# Patient Record
Sex: MALE | Race: WHITE | NOT HISPANIC OR LATINO | Employment: OTHER | URBAN - METROPOLITAN AREA
[De-identification: names, ages, dates, MRNs, and addresses within clinical notes are randomized per-mention and may not be internally consistent; named-entity substitution may affect disease eponyms.]

---

## 2017-01-04 RX ORDER — MONTELUKAST SODIUM 10 MG/1
TABLET ORAL
Qty: 90 TABLET | Refills: 1 | Status: SHIPPED | OUTPATIENT
Start: 2017-01-04 | End: 2017-06-30 | Stop reason: SDUPTHER

## 2017-01-04 RX ORDER — LOSARTAN POTASSIUM 25 MG/1
TABLET ORAL
Qty: 90 TABLET | Refills: 2 | Status: SHIPPED | OUTPATIENT
Start: 2017-01-04 | End: 2017-09-01 | Stop reason: SDUPTHER

## 2017-01-04 RX ORDER — OMEPRAZOLE 40 MG/1
CAPSULE, DELAYED RELEASE ORAL
Qty: 90 CAPSULE | Refills: 1 | Status: SHIPPED | OUTPATIENT
Start: 2017-01-04 | End: 2017-06-01 | Stop reason: SDUPTHER

## 2017-02-10 RX ORDER — MELOXICAM 7.5 MG/1
TABLET ORAL
Qty: 90 TABLET | Refills: 1 | Status: SHIPPED | OUTPATIENT
Start: 2017-02-10 | End: 2017-08-09 | Stop reason: SDUPTHER

## 2017-02-23 ENCOUNTER — OFFICE VISIT (OUTPATIENT)
Dept: FAMILY MEDICINE | Facility: CLINIC | Age: 67
End: 2017-02-23
Payer: MEDICARE

## 2017-02-23 VITALS
BODY MASS INDEX: 35.42 KG/M2 | WEIGHT: 276 LBS | DIASTOLIC BLOOD PRESSURE: 84 MMHG | HEIGHT: 74 IN | HEART RATE: 67 BPM | SYSTOLIC BLOOD PRESSURE: 136 MMHG

## 2017-02-23 DIAGNOSIS — J01.01 ACUTE RECURRENT MAXILLARY SINUSITIS: Primary | ICD-10-CM

## 2017-02-23 PROCEDURE — 99213 OFFICE O/P EST LOW 20 MIN: CPT | Mod: PBBFAC,PO | Performed by: NURSE PRACTITIONER

## 2017-02-23 PROCEDURE — 99213 OFFICE O/P EST LOW 20 MIN: CPT | Mod: S$PBB,,, | Performed by: NURSE PRACTITIONER

## 2017-02-23 PROCEDURE — 99999 PR PBB SHADOW E&M-EST. PATIENT-LVL III: CPT | Mod: PBBFAC,,, | Performed by: NURSE PRACTITIONER

## 2017-02-23 RX ORDER — AMOXICILLIN AND CLAVULANATE POTASSIUM 875; 125 MG/1; MG/1
1 TABLET, FILM COATED ORAL 2 TIMES DAILY
Qty: 20 TABLET | Refills: 0 | Status: SHIPPED | OUTPATIENT
Start: 2017-02-23 | End: 2017-03-05

## 2017-02-23 RX ORDER — PROMETHAZINE HYDROCHLORIDE AND DEXTROMETHORPHAN HYDROBROMIDE 6.25; 15 MG/5ML; MG/5ML
5 SYRUP ORAL 3 TIMES DAILY PRN
Qty: 240 ML | Refills: 0 | Status: SHIPPED | OUTPATIENT
Start: 2017-02-23 | End: 2017-03-05

## 2017-02-23 RX ORDER — FLUTICASONE PROPIONATE 50 MCG
1 SPRAY, SUSPENSION (ML) NASAL DAILY
Qty: 16 G | Refills: 0 | Status: SHIPPED | OUTPATIENT
Start: 2017-02-23 | End: 2018-08-15 | Stop reason: SDUPTHER

## 2017-02-23 NOTE — MR AVS SNAPSHOT
[facility]  [address]  [facility] [address]  Phone: [phone]  Fax: [phone]                  Matthew Rice   2017 9:20 AM   Office Visit    Description:  Male : 1950   Provider:  Jazmine Herman NP   Department:  [facility]           Reason for Visit     Sinus Problem     Cough           Diagnoses this Visit        Comments    Acute recurrent maxillary sinusitis    -  Primary            To Do List           Future Appointments        Provider Department Dept Phone    2017 9:20 AM Jazmine Herman NP [facility] [phone]    3/30/2017 8:00 AM Alvaro Bone MD [facility] [phone]      Goals (5 Years of Data)     None       These Medications        Disp Refills Start End    fluticasone (FLONASE) 50 mcg/actuation nasal spray 16 g 0 2017     1 spray by Each Nare route once daily. - Each Nare    Pharmacy: [facility] [address] [place] [address] AVE AT [facility] Ph #: [phone]       amoxicillin-clavulanate 875-125mg (AUGMENTIN) 875-125 mg per tablet 20 tablet 0 2017 3/5/2017    Take 1 tablet by mouth 2 (two) times daily. - Oral    Pharmacy: [facility] [address] [place], [place] [address]E AT [facility] Ph #: [phone]       promethazine-dextromethorphan (PROMETHAZINE-DM) 6.25-15 mg/5 mL Syrp 240 mL 0 2017 3/5/2017    Take 5 mLs by mouth 3 (three) times daily as needed. - Oral    Pharmacy: [facility] [address] [place] [address] AVE AT [facility] Ph #: [phone]         [facility]     [facility] On Call Nurse Care Line -  Assistance  Registered nurses in the [facility] provide clinical advisement, health education, appointment booking, and other advisory services.  Call for this free service at [phone].              Medications           START taking these NEW medications        Refills    fluticasone (FLONASE) 50 mcg/actuation nasal spray 0    Si spray by Each Nare route once daily.    Class: Normal    Route: Each Nare    amoxicillin-clavulanate 875-125mg (AUGMENTIN) 875-125 mg per tablet 0    Sig: Take 1 tablet by mouth 2 (two) times daily.    Class: Normal    Route: Oral    promethazine-dextromethorphan (PROMETHAZINE-DM) 6.25-15 mg/5 mL Syrp 0    Sig: Take 5 mLs by mouth 3 (three) times daily as needed.    Class: Normal    Route: Oral      STOP taking these medications     olopatadine (PATANOL) 0.1 % ophthalmic solution Place 1 drop into both eyes 2 (two) times daily.           Verify that the below list of medications is an accurate representation of the medications you are currently taking.  If none reported, the list may be blank. If incorrect, please contact your healthcare provider. Carry this list with you in case of emergency.           Current Medications     acepromazine maleate, bulk, 100 % Powd by Misc.(Non-Drug; Combo Route) route.      buPROPion (WELLBUTRIN XL) 150 MG TB24 tablet Take 300 mg by mouth once daily.     DIFLUCAN 200 mg Tab TAKE 1 TABLET EVERY 7 DAYS    ibuprofen (ADVIL,MOTRIN) 100 MG tablet Take 100 mg by mouth every 6 (six) hours as needed for Temperature greater than.    ibuprofen (ADVIL,MOTRIN) 800 MG tablet Take 800 mg by mouth. One at onset of headache.    losartan (COZAAR) 25 MG tablet TAKE 1 TABLET DAILY    meloxicam (MOBIC) 7.5 MG tablet TAKE 1 TABLET DAILY    montelukast (SINGULAIR) 10 mg tablet TAKE 1 TABLET EVERY EVENING    omeprazole (PRILOSEC) 40 MG capsule TAKE 1 CAPSULE BEFORE BREAKFAST    sumatriptan (IMITREX) 50 MG tablet Take 50 mg by mouth every 2 (two) hours as needed for Migraine.    tramadol-acetaminophen 37.5-325 mg (ULTRACET) 37.5-325 mg Tab Take 1 tablet by mouth every 4 (four) hours as needed for Pain.    VIAGRA 100 mg tablet TAKE 1 TABLET DAILY AS NEEDED FOR ERECTILE  "DYSFUNCTION    amoxicillin-clavulanate 875-125mg (AUGMENTIN) 875-125 mg per tablet Take 1 tablet by mouth 2 (two) times daily.    fluticasone (FLONASE) 50 mcg/actuation nasal spray 1 spray by Each Nare route once daily.    promethazine-dextromethorphan (PROMETHAZINE-DM) 6.25-15 mg/5 mL Syrp Take 5 mLs by mouth 3 (three) times daily as needed.           Clinical Reference Information           Your Vitals Were     BP Pulse Height Weight BMI    136/84 67 6' 2" (1.88 m) 125.2 kg (276 lb 0.3 oz) 35.44 kg/m2      Blood Pressure          Most Recent Value    BP  136/84      Allergies as of 2/23/2017     No Known Allergies      Immunizations Administered on Date of Encounter - 2/23/2017     None      Language Assistance Services     ATTENTION: Language assistance services are available, free of charge. Please call [phone].      ATENCIÓN: Si habla lance, tiene a hernandez disposición servicios gratuitos de asistencia lingüística. Llame al [phone].     COURTNEY Ý: N?u b?n nói Ti?ng Vi?t, có các d?ch v? h? tr? ngôn ng? mi?n phí lora cho b?n. G?i s? [phone].         [facility] complies with applicable Federal civil rights laws and does not discriminate on the basis of race, color, national origin, age, disability, or sex.        "

## 2017-02-23 NOTE — PROGRESS NOTES
"Subjective:       Patient ID: Matthew Rice is a 66 y.o. male.    Chief Complaint: Sinus Problem and Cough    HPI Comments: TETANUS VACCINE due on 05/14/1969  Pneumococcal (65+)(1 of 2 - PCV13) due on 05/14/2016    Past Medical History:  Past Medical History:    Arthritis of knee                                             Arthritis, shoulder region                                    Calculus of kidney                                            Calculus of kidney                                            Complication of anesthesia                                      Comment:pt states he has a "hard time waking up"    Depression                                                    Essential hypertension, benign                                Generalized osteoarthrosis, unspecified site                  Hypertension                                                  Idiopathic progressive polyneuropathy                         Impotence of organic origin                                   Obesity, unspecified                                          Osteoarthrosis, unspecified whether generalize*               Pneumonia                                                       Comment:1-4-13    Posttraumatic stress disorder                                 Pure hypercholesterolemia                                     Seborrheic dermatitis, unspecified                            Sleep apnea                                                   Sprain of medial collateral ligament of knee                  Unspecified conductive hearing loss                         Past Surgical History:    thumb cyst excision                                            ABDOMINAL HERNIA REPAIR                                      Review of patient's allergies indicates:  No Known Allergies  Current Outpatient Prescriptions on File Prior to Visit:  acepromazine maleate, bulk, 100 % Powd, by Misc.(Non-Drug; Combo Route) route.  , Disp: , Rfl: "   buPROPion (WELLBUTRIN XL) 150 MG TB24 tablet, Take 300 mg by mouth once daily. , Disp: , Rfl:   DIFLUCAN 200 mg Tab, TAKE 1 TABLET EVERY 7 DAYS, Disp: 12 tablet, Rfl: 0  ibuprofen (ADVIL,MOTRIN) 100 MG tablet, Take 100 mg by mouth every 6 (six) hours as needed for Temperature greater than., Disp: , Rfl:   ibuprofen (ADVIL,MOTRIN) 800 MG tablet, Take 800 mg by mouth. One at onset of headache., Disp: , Rfl:   losartan (COZAAR) 25 MG tablet, TAKE 1 TABLET DAILY, Disp: 90 tablet, Rfl: 2   meloxicam (MOBIC) 7.5 MG tablet, TAKE 1 TABLET DAILY, Disp: 90 tablet, Rfl: 1  montelukast (SINGULAIR) 10 mg tablet, TAKE 1 TABLET EVERY EVENING, Disp: 90 tablet, Rfl: 1  omeprazole (PRILOSEC) 40 MG capsule, TAKE 1 CAPSULE BEFORE BREAKFAST, Disp: 90 capsule, Rfl: 1  sumatriptan (IMITREX) 50 MG tablet, Take 50 mg by mouth every 2 (two) hours as needed for Migraine., Disp: , Rfl:   tramadol-acetaminophen 37.5-325 mg (ULTRACET) 37.5-325 mg Tab, Take 1 tablet by mouth every 4 (four) hours as needed for Pain., Disp: 60 tablet, Rfl: 3  VIAGRA 100 mg tablet, TAKE 1 TABLET DAILY AS NEEDED FOR ERECTILE DYSFUNCTION, Disp: 15 tablet, Rfl: 1  (DISCONTINUED) olopatadine (PATANOL) 0.1 % ophthalmic solution, Place 1 drop into both eyes 2 (two) times daily., Disp: 15 mL, Rfl: 1    No current facility-administered medications on file prior to visit.     Social History    Marital status: Single              Spouse name:                       Years of education:                 Number of children:               Occupational History    None on file    Social History Main Topics    Smoking status: Never Smoker                                                                   Smokeless status: Not on file                       Alcohol use: Yes                Comment: 4-5 beers 2-3 times per week    Drug use: Not on file     Sexual activity: Not on file          Other Topics            Concern    None on file    Social History Narrative    None on  file      Review of patient's family history indicates:    Anesthesia problems            Neg Hx                    Clotting disorder              Neg Hx                          Sinus Problem   This is a new problem. Episode onset: x 4 days. The problem has been gradually worsening since onset. There has been no fever. Associated symptoms include congestion, coughing (productive discolored sputum), a hoarse voice, sinus pressure (green and brown mucus) and a sore throat. Pertinent negatives include no chills, diaphoresis, ear pain, headaches, neck pain, shortness of breath, sneezing or swollen glands. Past treatments include nothing. The treatment provided no relief.     Review of Systems   Constitutional: Negative for chills, diaphoresis, fatigue and fever.   HENT: Positive for congestion, hoarse voice, postnasal drip, rhinorrhea, sinus pressure (green and brown mucus) and sore throat. Negative for ear pain and sneezing.    Respiratory: Positive for cough (productive discolored sputum). Negative for chest tightness, shortness of breath and wheezing.    Cardiovascular: Negative.    Gastrointestinal: Negative.  Negative for diarrhea, nausea and vomiting.   Musculoskeletal: Negative for neck pain.   Neurological: Negative for dizziness and headaches.       Objective:      Physical Exam   Constitutional: He is oriented to person, place, and time. No distress.   HENT:   Head: Normocephalic and atraumatic. Head is without raccoon's eyes.   Right Ear: A middle ear effusion is present.   Left Ear: A middle ear effusion is present.   Nose: Mucosal edema and rhinorrhea present. Right sinus exhibits maxillary sinus tenderness and frontal sinus tenderness. Left sinus exhibits maxillary sinus tenderness and frontal sinus tenderness.   Mouth/Throat: Uvula is midline. Posterior oropharyngeal erythema present.   Eyes: Pupils are equal, round, and reactive to light.   Neck: Normal range of motion.   Cardiovascular: Normal rate and  regular rhythm.  Exam reveals no friction rub.    No murmur heard.  Pulmonary/Chest: Effort normal and breath sounds normal. No respiratory distress. He has no wheezes.   Abdominal: Soft. Bowel sounds are normal. He exhibits no distension. There is no tenderness.   Musculoskeletal: He exhibits no edema.   Lymphadenopathy:     He has no cervical adenopathy.   Neurological: He is alert and oriented to person, place, and time.   Skin: He is not diaphoretic. No erythema.   Psychiatric: He has a normal mood and affect. His behavior is normal.   Vitals reviewed.      Assessment:       1. Acute recurrent maxillary sinusitis        Plan:       1. Acute recurrent maxillary sinusitis  Follow up if not resolving or for any new or worsening symptoms.   - fluticasone (FLONASE) 50 mcg/actuation nasal spray; 1 spray by Each Nare route once daily.  Dispense: 16 g; Refill: 0  - amoxicillin-clavulanate 875-125mg (AUGMENTIN) 875-125 mg per tablet; Take 1 tablet by mouth 2 (two) times daily.  Dispense: 20 tablet; Refill: 0  - promethazine-dextromethorphan (PROMETHAZINE-DM) 6.25-15 mg/5 mL Syrp; Take 5 mLs by mouth 3 (three) times daily as needed.  Dispense: 240 mL; Refill: 0

## 2017-03-16 ENCOUNTER — PATIENT OUTREACH (OUTPATIENT)
Dept: ADMINISTRATIVE | Facility: HOSPITAL | Age: 67
End: 2017-03-16

## 2017-03-16 NOTE — LETTER
March 22, 2017    Matthew Krystal  [address]  [place] [address]             [facility]  [address]  [facility] [address]  Phone: [phone] Dear Mr. Rice:    We have tried to reach you by mychart unsuccessfully.    [facility] is committed to your overall health.  To help you get the most out of each of your visits, we will review your information to make sure you are up to date on all of your recommended tests and/or procedures.       Dr. Alvaro Bone has found that you may be due for pneumonia and tetanus immunizations.     If you have had any of the above done at another facility, please bring the records or information with you so that your record at [facility] will be complete.     If you are currently taking medication, please bring it with you to your appointment for review.     Also, if you have any type of Advanced Directives, please bring them with you to your office visit so we may scan them into your chart.     If you have any questions or concerns, please don't hesitate to call.    Thank you for letting us care for you,  Aida Wallace LPN Clinical Care Coordinator  [facility]  [phone]

## 2017-03-30 ENCOUNTER — OFFICE VISIT (OUTPATIENT)
Dept: FAMILY MEDICINE | Facility: CLINIC | Age: 67
End: 2017-03-30
Payer: MEDICARE

## 2017-03-30 VITALS
DIASTOLIC BLOOD PRESSURE: 76 MMHG | WEIGHT: 274.69 LBS | SYSTOLIC BLOOD PRESSURE: 130 MMHG | HEART RATE: 63 BPM | RESPIRATION RATE: 18 BRPM | HEIGHT: 74 IN | BODY MASS INDEX: 35.25 KG/M2

## 2017-03-30 DIAGNOSIS — M17.0 OSTEOARTHRITIS OF BOTH KNEES, UNSPECIFIED OSTEOARTHRITIS TYPE: ICD-10-CM

## 2017-03-30 DIAGNOSIS — G43.909 MIGRAINE WITHOUT STATUS MIGRAINOSUS, NOT INTRACTABLE, UNSPECIFIED MIGRAINE TYPE: ICD-10-CM

## 2017-03-30 DIAGNOSIS — F43.10 PTSD (POST-TRAUMATIC STRESS DISORDER): ICD-10-CM

## 2017-03-30 DIAGNOSIS — I10 ESSENTIAL HYPERTENSION: Primary | ICD-10-CM

## 2017-03-30 PROCEDURE — 99999 PR PBB SHADOW E&M-EST. PATIENT-LVL III: CPT | Mod: PBBFAC,,, | Performed by: FAMILY MEDICINE

## 2017-03-30 PROCEDURE — 99214 OFFICE O/P EST MOD 30 MIN: CPT | Mod: S$PBB,,, | Performed by: FAMILY MEDICINE

## 2017-03-30 PROCEDURE — 99213 OFFICE O/P EST LOW 20 MIN: CPT | Mod: PBBFAC,PO | Performed by: FAMILY MEDICINE

## 2017-03-30 RX ORDER — TRAMADOL HYDROCHLORIDE AND ACETAMINOPHEN 37.5; 325 MG/1; MG/1
1 TABLET, FILM COATED ORAL EVERY 4 HOURS PRN
Qty: 60 TABLET | Refills: 3 | Status: SHIPPED | OUTPATIENT
Start: 2017-03-30 | End: 2017-06-30 | Stop reason: SDUPTHER

## 2017-03-30 NOTE — MR AVS SNAPSHOT
[facility]  [address] [facility] [address]  [facility] [address]  Phone: [phone]  Fax: [phone]                  Matthew Rice   3/30/2017 8:00 AM   Office Visit    Description:  Male : 1950   Provider:  Alvaro Bone MD   Department:  [facility]           Reason for Visit     Hypertension           Diagnoses this Visit        Comments    Essential hypertension    -  Primary     Osteoarthritis of both knees, unspecified osteoarthritis type                To Do List           Future Appointments        Provider Department Dept Phone    2017 8:00 AM Alvaro Bone MD [facility] [phone]      Goals (5 Years of Data)     None       These Medications        Disp Refills Start End    tramadol-acetaminophen 37.5-325 mg (ULTRACET) 37.5-325 mg Tab 60 tablet 3 3/30/2017     Take 1 tablet by mouth every 4 (four) hours as needed for Pain. - Oral    [facility]: [facility] [address] [place] [address] Ph #: [phone]         [facility] On [facility]     [facility] On Call Nurse Care Line -  Assistance  Unless otherwise directed by your provider, please contact [facility], our nurse care line that is available for  assistance.     Registered nurses in the [facility] provide: appointment scheduling, clinical advisement, health education, and other advisory services.  Call: [phone] (toll free)               Medications                Verify that the below list of medications is an accurate representation of the medications you are currently taking.  If none reported, the list may be blank. If incorrect, please contact your healthcare provider. Carry this list with you in case of emergency.           Current Medications     acepromazine maleate, bulk, 100 % Powd by Misc.(Non-Drug; Combo Route) route.      buPROPion (WELLBUTRIN XL) 150 MG TB24 tablet Take 300 mg  "by mouth once daily.     DIFLUCAN 200 mg Tab TAKE 1 TABLET EVERY 7 DAYS    fluticasone (FLONASE) 50 mcg/actuation nasal spray 1 spray by Each Nare route once daily.    ibuprofen (ADVIL,MOTRIN) 100 MG tablet Take 100 mg by mouth every 6 (six) hours as needed for Temperature greater than.    ibuprofen (ADVIL,MOTRIN) 800 MG tablet Take 800 mg by mouth. One at onset of headache.    losartan (COZAAR) 25 MG tablet TAKE 1 TABLET DAILY    meloxicam (MOBIC) 7.5 MG tablet TAKE 1 TABLET DAILY    montelukast (SINGULAIR) 10 mg tablet TAKE 1 TABLET EVERY EVENING    omeprazole (PRILOSEC) 40 MG capsule TAKE 1 CAPSULE BEFORE BREAKFAST    sumatriptan (IMITREX) 50 MG tablet Take 50 mg by mouth every 2 (two) hours as needed for Migraine.    tramadol-acetaminophen 37.5-325 mg (ULTRACET) 37.5-325 mg Tab Take 1 tablet by mouth every 4 (four) hours as needed for Pain.    VIAGRA 100 mg tablet TAKE 1 TABLET DAILY AS NEEDED FOR ERECTILE DYSFUNCTION           Clinical Reference Information           Your Vitals Were     BP Pulse Resp Height Weight BMI    130/76 63 18 6' 2" (1.88 m) 124.6 kg (274 lb 11.1 oz) 35.27 kg/m2      Blood Pressure          Most Recent Value    BP  130/76      Allergies as of 3/30/2017     No Known Allergies      Immunizations Administered on Date of Encounter - 3/30/2017     None      Language Assistance Services     ATTENTION: Language assistance services are available, free of charge. Please call [phone].      ATENCIÓN: Si habla lance, tiene a hernandez disposición servicios gratuitos de asistencia lingüística. Llame al [phone].     COURTNEY Ý: N?u b?n nói Ti?ng Vi?t, có các d?ch v? h? tr? ngôn ng? mi?n phí dành cho b?n. G?i s? [phone].         [facility] complies with applicable Federal civil rights laws and does not discriminate on the basis of race, color, national origin, age, disability, or sex.        "

## 2017-03-30 NOTE — PROGRESS NOTES
Subjective:     THIS DOCUMENT WAS MADE IN PART WITH [employer] DICTATION SOFTWARE.  OCCASIONALLY THIS SOFTWARE WILL MISINTERPRET WORDS OR PHRASES.     Patient ID: Matthew Rice is a 66 y.o. male.    Chief Complaint: Hypertension (3 month follow up;  tetanus and pneumonia vaccine )    HPI   Follow up hypertension, chronic, stable and well controlled  No headaches, no cp  Stable at home as well    Anxiety/ptsd, stress, 'getting [employer] business behind me'  Did community service.  [facility].      Migraines, one this month but 'still one day left this month'    OA knees, flair up after community service  Needs tramadol refilled     Positive depression screen. Please note that [facility] who [facility] his [facility] and depression and PTSD    Review of Systems   Respiratory: Negative for shortness of breath.    Cardiovascular: Negative for chest pain.   Gastrointestinal: Negative for abdominal pain.   Musculoskeletal: Positive for arthralgias.   Psychiatric/Behavioral: Positive for dysphoric mood. Negative for suicidal ideas. The patient is nervous/anxious.        Objective:      Physical Exam   Constitutional: He is oriented to person, place, and time. He appears well-developed and well-nourished. No distress.   HENT:   Head: Normocephalic and atraumatic.   Eyes: Conjunctivae are normal. No scleral icterus.   Pulmonary/Chest: Effort normal. No respiratory distress.   Neurological: He is alert and oriented to person, place, and time. Coordination normal.   Skin: He is not diaphoretic.   Psychiatric: He has a normal mood and affect. His behavior is normal.   Vitals reviewed.      Assessment:       1. Essential hypertension    2. Osteoarthritis of both knees, unspecified osteoarthritis type    3. Migraine without status migrainosus, not intractable, unspecified migraine type    4. PTSD (post-traumatic stress disorder)        Plan:       Matthew was seen today for  hypertension.    Diagnoses and all orders for this visit:    Essential hypertension   stable continue current medications follow every three months  Osteoarthritis of both knees, unspecified osteoarthritis type  -     tramadol-acetaminophen 37.5-325 mg (ULTRACET) 37.5-325 mg Tab; Take 1 tablet by mouth every 4 (four) hours as needed for Pain.    Migraine without status migrainosus, not intractable, unspecified migraine type   these have been generally well-controlled and less frequent.    PTSD (post-traumatic stress disorder)   Ongoing condition without any acute exacerbation or emergency. [facility].

## 2017-04-16 RX ORDER — SILDENAFIL CITRATE 100 MG/1
TABLET, FILM COATED ORAL
Qty: 15 TABLET | Refills: 1 | Status: SHIPPED | OUTPATIENT
Start: 2017-04-16 | End: 2021-11-30

## 2017-06-01 RX ORDER — OMEPRAZOLE 40 MG/1
CAPSULE, DELAYED RELEASE ORAL
Qty: 90 CAPSULE | Refills: 0 | Status: SHIPPED | OUTPATIENT
Start: 2017-06-01 | End: 2017-08-30 | Stop reason: SDUPTHER

## 2017-06-30 ENCOUNTER — OFFICE VISIT (OUTPATIENT)
Dept: FAMILY MEDICINE | Facility: CLINIC | Age: 67
End: 2017-06-30
Payer: MEDICARE

## 2017-06-30 VITALS
BODY MASS INDEX: 36.27 KG/M2 | SYSTOLIC BLOOD PRESSURE: 132 MMHG | HEART RATE: 64 BPM | HEIGHT: 74 IN | DIASTOLIC BLOOD PRESSURE: 72 MMHG | RESPIRATION RATE: 18 BRPM | WEIGHT: 282.63 LBS

## 2017-06-30 DIAGNOSIS — M19.019 ARTHRITIS, SHOULDER REGION: ICD-10-CM

## 2017-06-30 DIAGNOSIS — E78.5 DYSLIPIDEMIA: ICD-10-CM

## 2017-06-30 DIAGNOSIS — I10 ESSENTIAL HYPERTENSION: Primary | ICD-10-CM

## 2017-06-30 DIAGNOSIS — M17.0 OSTEOARTHRITIS OF BOTH KNEES, UNSPECIFIED OSTEOARTHRITIS TYPE: ICD-10-CM

## 2017-06-30 PROCEDURE — 1159F MED LIST DOCD IN RCRD: CPT | Mod: ,,, | Performed by: FAMILY MEDICINE

## 2017-06-30 PROCEDURE — 99213 OFFICE O/P EST LOW 20 MIN: CPT | Mod: PBBFAC,PO | Performed by: FAMILY MEDICINE

## 2017-06-30 PROCEDURE — 99999 PR PBB SHADOW E&M-EST. PATIENT-LVL III: CPT | Mod: PBBFAC,,, | Performed by: FAMILY MEDICINE

## 2017-06-30 PROCEDURE — 99214 OFFICE O/P EST MOD 30 MIN: CPT | Mod: S$PBB,,, | Performed by: FAMILY MEDICINE

## 2017-06-30 PROCEDURE — 1125F AMNT PAIN NOTED PAIN PRSNT: CPT | Mod: ,,, | Performed by: FAMILY MEDICINE

## 2017-06-30 RX ORDER — MONTELUKAST SODIUM 10 MG/1
10 TABLET ORAL NIGHTLY
Qty: 90 TABLET | Refills: 1 | Status: SHIPPED | OUTPATIENT
Start: 2017-06-30 | End: 2018-09-20 | Stop reason: SDUPTHER

## 2017-06-30 RX ORDER — TRAMADOL HYDROCHLORIDE AND ACETAMINOPHEN 37.5; 325 MG/1; MG/1
1 TABLET, FILM COATED ORAL EVERY 4 HOURS PRN
Qty: 60 TABLET | Refills: 3 | Status: SHIPPED | OUTPATIENT
Start: 2017-06-30 | End: 2018-09-20 | Stop reason: SDUPTHER

## 2017-06-30 NOTE — PROGRESS NOTES
Subjective:     THIS DOCUMENT WAS MADE IN PART WITH "[employer]" DICTATION SOFTWARE.  OCCASIONALLY THIS SOFTWARE WILL MISINTERPRET WORDS OR PHRASES.     Patient ID: Matthew Rice is a 67 y.o. male.    Chief Complaint: Headache; pain in joints; and Hypertension (follow up )    HPI     HTN, 130/72 home average  High 140/82    Dyslipidemia  [facility] reviewed, scanned, he request to work on diet changes, he has already begun    Arthritis, chronic stable    Depression/PTSD  Worse, brother , [facility],   Admits to stress, no SI    Active Ambulatory Problems     Diagnosis Date Noted    Hypertension     Arthritis, shoulder region     Arthritis of knee     Sleep apnea     PTSD (post-traumatic stress disorder) 2013    Anxiety 2015    GERD (gastroesophageal reflux disease) 2015    Dyslipidemia 2016    Migraine without status migrainosus, not intractable 2017    Osteoarthritis of both knees 2017     Resolved Ambulatory Problems     Diagnosis Date Noted    No Resolved Ambulatory Problems     Past Medical History:   Diagnosis Date    Arthritis of knee     Arthritis, shoulder region     Calculus of kidney     Calculus of kidney     Complication of anesthesia     Depression     Essential hypertension, benign     Generalized osteoarthrosis, unspecified site     Hypertension     Idiopathic progressive polyneuropathy     Impotence of organic origin     Obesity, unspecified     Osteoarthrosis, unspecified whether generalized or localized, unspecified site     Pneumonia     Posttraumatic stress disorder     Pure hypercholesterolemia     Seborrheic dermatitis, unspecified     Sleep apnea     Sprain of medial collateral ligament of knee     Unspecified conductive hearing loss        Review of Systems   HENT: Negative for trouble swallowing.    Eyes: Negative for visual disturbance.   Respiratory: Negative for shortness of breath.    Cardiovascular: Negative  for chest pain.   Gastrointestinal: Negative for abdominal pain.   Musculoskeletal: Positive for arthralgias.   Psychiatric/Behavioral: Positive for dysphoric mood. Negative for suicidal ideas. The patient is nervous/anxious.        Objective:      Physical Exam   Constitutional: He is oriented to person, place, and time. He appears well-developed and well-nourished. No distress.   HENT:   Head: Normocephalic and atraumatic.   Eyes: No scleral icterus.   Neck: Normal range of motion. Neck supple.   Cardiovascular: Normal rate, regular rhythm and normal heart sounds.  Exam reveals no gallop.    No murmur heard.  Pulmonary/Chest: Effort normal. No respiratory distress.   Neurological: He is alert and oriented to person, place, and time.   Skin: Skin is warm and dry. He is not diaphoretic.   Psychiatric: He has a normal mood and affect. His behavior is normal.   Vitals reviewed.      Assessment:       1. Essential hypertension    2. Dyslipidemia    3. Arthritis, shoulder region        Plan:       Matthew was seen today for headache, pain in joints and hypertension.    Diagnoses and all orders for this visit:    Essential hypertension  stable  Dyslipidemia  -     Lipid panel; Future  Increased, repeat 3 months  Arthritis, shoulder region  Stable    [facility]

## 2017-08-09 RX ORDER — MELOXICAM 7.5 MG/1
TABLET ORAL
Qty: 90 TABLET | Refills: 0 | Status: SHIPPED | OUTPATIENT
Start: 2017-08-09 | End: 2017-11-07 | Stop reason: SDUPTHER

## 2017-08-30 RX ORDER — OMEPRAZOLE 40 MG/1
CAPSULE, DELAYED RELEASE ORAL
Qty: 90 CAPSULE | Refills: 3 | Status: SHIPPED | OUTPATIENT
Start: 2017-08-30 | End: 2018-09-20 | Stop reason: SDUPTHER

## 2017-09-01 RX ORDER — LOSARTAN POTASSIUM 25 MG/1
TABLET ORAL
Qty: 90 TABLET | Refills: 3 | Status: SHIPPED | OUTPATIENT
Start: 2017-09-01 | End: 2018-09-20 | Stop reason: SDUPTHER

## 2017-09-19 ENCOUNTER — PATIENT OUTREACH (OUTPATIENT)
Dept: ADMINISTRATIVE | Facility: HOSPITAL | Age: 67
End: 2017-09-19

## 2017-09-19 NOTE — LETTER
September 25, 2017    Matthew Rice  [address]  [place] [address]             [facility]  [address]  [facility] [address]  Phone: [phone] Dear Mr. Rice:    We have tried to reach you by mychart unsuccessfully.    [facility] is committed to your overall health.  To help you get the most out of each of your visits, we will review your information to make sure you are up to date on all of your recommended tests and/or procedures.       Dr. Alvaro Bone has found that you may be due for tetanus, pneumonia, and flu immunizations.     Medicare does not cover all immunizations to be given in the clinic.  Check your benefits to ensure that you do not need to receive your immunizations at the pharmacy.     If you have had any of the above done at another facility, please bring the records or information with you so that your record at [facility] will be complete.  If you would like to schedule any of these, please contact me.     If you are currently taking medication, please bring it with you to your appointment for review.     Also, if you have any type of Advanced Directives, please bring them with you to your office visit so we may scan them into your chart.     If you have any questions or concerns, please don't hesitate to call.    Thank you for letting us care for you,  Aida Wallace LPN Clinical Care Coordinator  [facility]  [phone]

## 2017-09-26 ENCOUNTER — LAB VISIT (OUTPATIENT)
Dept: LAB | Facility: HOSPITAL | Age: 67
End: 2017-09-26
Attending: FAMILY MEDICINE
Payer: MEDICARE

## 2017-09-26 DIAGNOSIS — E78.5 DYSLIPIDEMIA: ICD-10-CM

## 2017-09-26 LAB
CHOLEST SERPL-MCNC: 188 MG/DL
CHOLEST/HDLC SERPL: 6.1 {RATIO}
HDLC SERPL-MCNC: 31 MG/DL
HDLC SERPL: 16.5 %
LDLC SERPL CALC-MCNC: 124.8 MG/DL
NONHDLC SERPL-MCNC: 157 MG/DL
TRIGL SERPL-MCNC: 161 MG/DL

## 2017-09-26 PROCEDURE — 80061 LIPID PANEL: CPT

## 2017-09-26 PROCEDURE — 36415 COLL VENOUS BLD VENIPUNCTURE: CPT | Mod: PO

## 2017-10-03 ENCOUNTER — OFFICE VISIT (OUTPATIENT)
Dept: FAMILY MEDICINE | Facility: CLINIC | Age: 67
End: 2017-10-03
Payer: MEDICARE

## 2017-10-03 VITALS
DIASTOLIC BLOOD PRESSURE: 74 MMHG | HEART RATE: 61 BPM | HEIGHT: 74 IN | SYSTOLIC BLOOD PRESSURE: 136 MMHG | WEIGHT: 274.06 LBS | TEMPERATURE: 99 F | BODY MASS INDEX: 35.17 KG/M2

## 2017-10-03 DIAGNOSIS — F41.9 ANXIETY: ICD-10-CM

## 2017-10-03 DIAGNOSIS — K40.90 NON-RECURRENT UNILATERAL INGUINAL HERNIA WITHOUT OBSTRUCTION OR GANGRENE: ICD-10-CM

## 2017-10-03 DIAGNOSIS — I10 ESSENTIAL HYPERTENSION: Primary | ICD-10-CM

## 2017-10-03 DIAGNOSIS — E78.5 DYSLIPIDEMIA: ICD-10-CM

## 2017-10-03 PROCEDURE — 99214 OFFICE O/P EST MOD 30 MIN: CPT | Mod: S$PBB,,, | Performed by: FAMILY MEDICINE

## 2017-10-03 PROCEDURE — 99999 PR PBB SHADOW E&M-EST. PATIENT-LVL III: CPT | Mod: PBBFAC,,, | Performed by: FAMILY MEDICINE

## 2017-10-03 PROCEDURE — 99213 OFFICE O/P EST LOW 20 MIN: CPT | Mod: PBBFAC,PN | Performed by: FAMILY MEDICINE

## 2017-10-03 NOTE — PROGRESS NOTES
Subjective:     THIS DOCUMENT WAS MADE IN PART WITH [email or web address] DICTATION SOFTWARE.  OCCASIONALLY THIS SOFTWARE WILL MISINTERPRET WORDS OR PHRASES.     Patient ID: Matthew Rice is a 67 y.o. male.    Chief Complaint: Follow-up (3 month follow up) and Hernia (pt states he believes he has had a hernia for a few months )    HPI     Follow-up several chronic conditions.    Hypertension, chronic condition, this is stable and satisfactory    Hyperlipidemia chronic condition improved though HDL still remains low but overall better with dietary changes.  Dietary changes, ready to exercise    Stress and anxiety, up and down but relatively stable.  Denies any depression.  Denies any SI.  [facility]    Chronic arthritis and chronic pain.  He remains on tramadol as needed as his relatively stable.  He also takes meloxicam at the lower dose daily.      Active Ambulatory Problems     Diagnosis Date Noted    Hypertension     Arthritis, shoulder region     Arthritis of knee     Sleep apnea     PTSD (post-traumatic stress disorder) 12/17/2013    Anxiety 09/16/2015    GERD (gastroesophageal reflux disease) 09/16/2015    Dyslipidemia 08/24/2016    Migraine without status migrainosus, not intractable 03/30/2017    Osteoarthritis of both knees 03/30/2017     Resolved Ambulatory Problems     Diagnosis Date Noted    No Resolved Ambulatory Problems     Past Medical History:   Diagnosis Date    Arthritis of knee     Arthritis, shoulder region     Calculus of kidney     Calculus of kidney     Complication of anesthesia     Depression     Essential hypertension, benign     Generalized osteoarthrosis, unspecified site     Hypertension     Idiopathic progressive polyneuropathy     Impotence of organic origin     Obesity, unspecified     Osteoarthrosis, unspecified whether generalized or localized, unspecified site     Pneumonia     Posttraumatic stress disorder     Pure hypercholesterolemia     Seborrheic dermatitis,  unspecified     Sleep apnea     Sprain of medial collateral ligament of knee     Unspecified conductive hearing loss        Review of Systems   Constitutional: Negative for fever.   HENT: Negative.    Eyes: Negative for visual disturbance.   Respiratory: Negative for shortness of breath.    Cardiovascular: Negative for chest pain.   Gastrointestinal: Negative for abdominal pain.        Inguinal hernia, [facility], no pain or vomiting.   Musculoskeletal: Positive for arthralgias.   Psychiatric/Behavioral: Positive for dysphoric mood. Negative for sleep disturbance and suicidal ideas. The patient is nervous/anxious.        Objective:      Physical Exam   Constitutional: He is oriented to person, place, and time. He appears well-developed and well-nourished. No distress.   HENT:   Head: Normocephalic and atraumatic.   Eyes: No scleral icterus.   Neck: Normal range of motion. Neck supple.   Cardiovascular: Normal rate, regular rhythm and normal heart sounds.  Exam reveals no gallop and no friction rub.    No murmur heard.  Pulmonary/Chest: Effort normal and breath sounds normal. No respiratory distress. He has no wheezes.   Neurological: He is alert and oriented to person, place, and time.   Skin: Skin is warm and dry. He is not diaphoretic.   Psychiatric: He has a normal mood and affect. His behavior is normal.   Vitals reviewed.      Assessment:       1. Essential hypertension    2. Dyslipidemia    3. Anxiety    4. Non-recurrent unilateral inguinal hernia without obstruction or gangrene        Plan:       Matthew was seen today for follow-up and hernia.    Diagnoses and all orders for this visit:    Essential hypertension  Stable    Dyslipidemia  Improved    Anxiety  Still stress, seeing psych    Non-recurrent unilateral inguinal hernia without obstruction or gangrene  -     Ambulatory referral to General Surgery         follow in 3 months

## 2017-10-05 ENCOUNTER — OFFICE VISIT (OUTPATIENT)
Dept: SURGERY | Facility: CLINIC | Age: 67
End: 2017-10-05
Payer: MEDICARE

## 2017-10-05 VITALS
WEIGHT: 271.38 LBS | HEART RATE: 75 BPM | HEIGHT: 74 IN | SYSTOLIC BLOOD PRESSURE: 137 MMHG | TEMPERATURE: 98 F | BODY MASS INDEX: 34.83 KG/M2 | DIASTOLIC BLOOD PRESSURE: 72 MMHG

## 2017-10-05 DIAGNOSIS — K40.90 INGUINAL HERNIA WITHOUT OBSTRUCTION OR GANGRENE, RECURRENCE NOT SPECIFIED, UNSPECIFIED LATERALITY: Primary | ICD-10-CM

## 2017-10-05 PROCEDURE — 99204 OFFICE O/P NEW MOD 45 MIN: CPT | Mod: S$PBB,,, | Performed by: SURGERY

## 2017-10-05 PROCEDURE — 99999 PR PBB SHADOW E&M-EST. PATIENT-LVL III: CPT | Mod: PBBFAC,,, | Performed by: SURGERY

## 2017-10-05 PROCEDURE — 99213 OFFICE O/P EST LOW 20 MIN: CPT | Mod: PBBFAC,PO | Performed by: SURGERY

## 2017-10-05 RX ORDER — CHOLECALCIFEROL (VITAMIN D3) 25 MCG
1000 TABLET ORAL 2 TIMES DAILY
COMMUNITY
End: 2021-11-30

## 2017-10-05 RX ORDER — AMOXICILLIN 500 MG
2 CAPSULE ORAL DAILY
COMMUNITY

## 2017-10-05 RX ORDER — CETIRIZINE HYDROCHLORIDE 10 MG/1
10 TABLET ORAL DAILY
COMMUNITY
End: 2018-08-15

## 2017-10-05 RX ORDER — DIAZEPAM 2 MG/1
2 TABLET ORAL EVERY 6 HOURS PRN
COMMUNITY

## 2017-10-05 NOTE — LETTER
October 5, 2017      Alvaro Bone MD  [address] [facility] [address]  [facility] [address]           [facility]  [address] [facility] [address] [place] [address]  Phone: [phone]          Patient: Matthew Rice   MR Number: [id number]   YOB: 1950   Date of Visit: 10/5/2017       Dear Dr. Alvaro Bone:    Thank you for referring Matthew Rice to me for evaluation. Attached you will find relevant portions of my assessment and plan of care.    If you have questions, please do not hesitate to call me. I look forward to following Matthew Rice along with you.    Sincerely,    Rakesh Calderón MD    Enclosure  CC:  No Recipients    If you would like to receive this communication electronically, please contact [email or web address] or [phone] to request more information on [employer] access.    For providers and/or their staff who would like to refer a patient to [facility], please contact us through our one-stop-shop provider referral line, [facility] [place], at [phone].    If you feel you have received this communication in error or would no longer like to receive these types of communications, please e-mail [email or web address]

## 2017-10-05 NOTE — PROGRESS NOTES
Subjective:       Patient ID: Matthew Rcie is a 67 y.o. male.    Chief Complaint: Consult (Unilateral IH)    HPI  Pleasant 66 yo M referred to me for evaluation of inguinal hernia. Pt notes that he has had a bulge in his L groin for the last several months.  He states that it has gotten progressively more painful and uncomfortably.  He denies n/v.  No fever/chills. Notes normal bowel function.  Pt states that her feels that it is a hernia and given the increasing pain desires repair.  Pt notes he has had previous hernia repair but is uncertain what type.  PT also notes that he has been actively loosing weight in the past few months.  Pt suffers with HTN and hypercholesterolemia.   Review of Systems   Constitutional: Negative for activity change, appetite change, diaphoresis, fever and unexpected weight change.   HENT: Negative for congestion and hearing loss.    Respiratory: Negative for cough, chest tightness and wheezing.    Cardiovascular: Negative for chest pain and palpitations.   Gastrointestinal: Negative for abdominal distention, abdominal pain, constipation, diarrhea, nausea and vomiting.   Genitourinary: Negative for difficulty urinating, dysuria and hematuria.   Musculoskeletal: Negative for back pain and gait problem.   Skin: Negative for color change and wound.   Neurological: Negative for tremors and seizures.   Hematological: Negative for adenopathy. Does not bruise/bleed easily.   Psychiatric/Behavioral: Negative for agitation and decreased concentration.       Objective:      Physical Exam   Constitutional: He is oriented to person, place, and time. He appears well-developed and well-nourished.   Obese     HENT:   Head: Normocephalic and atraumatic.   Eyes: Pupils are equal, round, and reactive to light.   Neck: Normal range of motion. No tracheal deviation present. No thyromegaly present.   Cardiovascular: Normal rate, regular rhythm and normal heart sounds.  Exam reveals no gallop and no  friction rub.    No murmur heard.  Pulmonary/Chest: Effort normal and breath sounds normal. He has no wheezes. He exhibits no tenderness.   Abdominal: He exhibits no distension and no mass. There is no tenderness. There is no rebound and no guarding. A hernia is present. Hernia confirmed positive in the left inguinal area.       Musculoskeletal: Normal range of motion.   Lymphadenopathy:     He has no axillary adenopathy.        Right: No supraclavicular adenopathy present.        Left: No supraclavicular adenopathy present.   Neurological: He is alert and oriented to person, place, and time. Coordination normal.   Skin: Skin is warm.   Psychiatric: He has a normal mood and affect.   Vitals reviewed.      Assessment:     L inguinal hernia  No diagnosis found.    Plan:       Lengthy d/w pt.  Given that he is morbidly obese but that he has been successfully losing weight at present I have recommended that he continue with further weight loss. Pt will RTC in 6 weeks for f/u and reevaluation.  If he has had continued weiht loss and is still bothered by the hernia would consider surgical repair at that time.  D/w pt that we will also need to obtain records from Dr Palmer to see what surgery was done previously

## 2017-11-07 DIAGNOSIS — I10 ESSENTIAL HYPERTENSION: Primary | ICD-10-CM

## 2017-11-07 RX ORDER — MELOXICAM 7.5 MG/1
TABLET ORAL
Qty: 90 TABLET | Refills: 0 | Status: SHIPPED | OUTPATIENT
Start: 2017-11-07 | End: 2018-05-17 | Stop reason: SDUPTHER

## 2017-11-07 NOTE — PROGRESS NOTES
Refill Authorization Note     is requesting a refill authorization.    Brief assessment and rational for refill: APPROVE: prr  Name of medication: MELOXICAM TABS 7.5MG  How patient will take medication: t1t po daily   Amount/Quantity of medication ordered: 90 d  Medication reconciliation completed: No        Refills Authorized: Yes  If authorized number of refills: 0        Medication Therapy Plan: Chronic arthritis.  needs new CMP; will order; NTBS   Comments:   Lab Results   Component Value Date    CREATININE 1.2 12/01/2016    BUN 14 12/01/2016     12/01/2016    K 4.9 12/01/2016     12/01/2016    CO2 26 12/01/2016      BP Readings from Last 3 Encounters:   10/05/17 137/72   10/03/17 136/74   06/30/17 132/72      Lab Results   Component Value Date    ALT 22 12/01/2016    AST 19 12/01/2016    ALKPHOS 72 12/01/2016    BILITOT 0.7 12/01/2016

## 2017-11-08 ENCOUNTER — TELEPHONE (OUTPATIENT)
Dept: ORTHOPEDICS | Facility: CLINIC | Age: 67
End: 2017-11-08

## 2017-11-08 NOTE — TELEPHONE ENCOUNTER
----- Message from Herbert Loza sent at 11/8/2017  1:04 PM CST -----  Contact: same  Patient called in and would like a call back about scheduling an injection for his right knee.  Patient call back number is [phone]

## 2017-11-08 NOTE — TELEPHONE ENCOUNTER
Left message with patient that if he needed an appointment I could make him one for Friday. Let phone number for return call.

## 2017-11-10 ENCOUNTER — OFFICE VISIT (OUTPATIENT)
Dept: ORTHOPEDICS | Facility: CLINIC | Age: 67
End: 2017-11-10
Payer: MEDICARE

## 2017-11-10 VITALS — BODY MASS INDEX: 34.78 KG/M2 | WEIGHT: 271 LBS | HEIGHT: 74 IN

## 2017-11-10 DIAGNOSIS — M25.561 RIGHT KNEE PAIN, UNSPECIFIED CHRONICITY: ICD-10-CM

## 2017-11-10 DIAGNOSIS — M17.11 PRIMARY OSTEOARTHRITIS OF RIGHT KNEE: Primary | ICD-10-CM

## 2017-11-10 PROCEDURE — 99214 OFFICE O/P EST MOD 30 MIN: CPT | Mod: 25,S$PBB,, | Performed by: ORTHOPAEDIC SURGERY

## 2017-11-10 PROCEDURE — 99212 OFFICE O/P EST SF 10 MIN: CPT | Mod: PBBFAC,PO | Performed by: ORTHOPAEDIC SURGERY

## 2017-11-10 PROCEDURE — 20610 DRAIN/INJ JOINT/BURSA W/O US: CPT | Mod: PBBFAC,PO | Performed by: ORTHOPAEDIC SURGERY

## 2017-11-10 PROCEDURE — 99999 PR PBB SHADOW E&M-EST. PATIENT-LVL II: CPT | Mod: PBBFAC,,, | Performed by: ORTHOPAEDIC SURGERY

## 2017-11-10 RX ORDER — TRIAMCINOLONE ACETONIDE 40 MG/ML
40 INJECTION, SUSPENSION INTRA-ARTICULAR; INTRAMUSCULAR
Status: DISCONTINUED | OUTPATIENT
Start: 2017-11-10 | End: 2017-11-10 | Stop reason: HOSPADM

## 2017-11-10 RX ADMIN — TRIAMCINOLONE ACETONIDE 40 MG: 40 INJECTION, SUSPENSION INTRA-ARTICULAR; INTRAMUSCULAR at 09:11

## 2017-11-10 NOTE — PROCEDURES
Large Joint Aspiration/Injection  Date/Time: 11/10/2017 9:01 AM  Performed by: ELLIS DOLL  Authorized by: ELLIS DOLL.     Consent Done?:  Yes (Verbal)  Indications:  Pain  Timeout: Prior to procedure the correct patient, procedure, and site was verified      Location:  Knee  Site:  R knee  Prep: Patient was prepped and draped in usual sterile fashion    Ultrasonic Guidance for needle placement: No  Needle size:  21 G  Approach:  Anterolateral  Medications:  40 mg triamcinolone acetonide 40 mg/mL  Patient tolerance:  Patient tolerated the procedure well with no immediate complications

## 2017-11-16 ENCOUNTER — OFFICE VISIT (OUTPATIENT)
Dept: SURGERY | Facility: CLINIC | Age: 67
End: 2017-11-16
Payer: MEDICARE

## 2017-11-16 ENCOUNTER — LAB VISIT (OUTPATIENT)
Dept: LAB | Facility: HOSPITAL | Age: 67
End: 2017-11-16
Attending: FAMILY MEDICINE
Payer: MEDICARE

## 2017-11-16 VITALS
TEMPERATURE: 98 F | SYSTOLIC BLOOD PRESSURE: 156 MMHG | HEIGHT: 74 IN | HEART RATE: 55 BPM | WEIGHT: 267.44 LBS | DIASTOLIC BLOOD PRESSURE: 88 MMHG | BODY MASS INDEX: 34.32 KG/M2

## 2017-11-16 DIAGNOSIS — I10 ESSENTIAL HYPERTENSION: ICD-10-CM

## 2017-11-16 DIAGNOSIS — K40.91 UNILATERAL RECURRENT INGUINAL HERNIA WITHOUT OBSTRUCTION OR GANGRENE: Primary | ICD-10-CM

## 2017-11-16 LAB
ALBUMIN SERPL BCP-MCNC: 4 G/DL
ALP SERPL-CCNC: 80 U/L
ALT SERPL W/O P-5'-P-CCNC: 19 U/L
ANION GAP SERPL CALC-SCNC: 7 MMOL/L
AST SERPL-CCNC: 19 U/L
BILIRUB SERPL-MCNC: 1.1 MG/DL
BUN SERPL-MCNC: 18 MG/DL
CALCIUM SERPL-MCNC: 10 MG/DL
CHLORIDE SERPL-SCNC: 104 MMOL/L
CO2 SERPL-SCNC: 29 MMOL/L
CREAT SERPL-MCNC: 1 MG/DL
EST. GFR  (AFRICAN AMERICAN): >60 ML/MIN/1.73 M^2
EST. GFR  (NON AFRICAN AMERICAN): >60 ML/MIN/1.73 M^2
GLUCOSE SERPL-MCNC: 99 MG/DL
POTASSIUM SERPL-SCNC: 4.8 MMOL/L
PROT SERPL-MCNC: 7.5 G/DL
SODIUM SERPL-SCNC: 140 MMOL/L

## 2017-11-16 PROCEDURE — 36415 COLL VENOUS BLD VENIPUNCTURE: CPT | Mod: PO

## 2017-11-16 PROCEDURE — 80053 COMPREHEN METABOLIC PANEL: CPT

## 2017-11-16 PROCEDURE — 99212 OFFICE O/P EST SF 10 MIN: CPT | Mod: S$PBB,,, | Performed by: SURGERY

## 2017-11-16 PROCEDURE — 99999 PR PBB SHADOW E&M-EST. PATIENT-LVL III: CPT | Mod: PBBFAC,,, | Performed by: SURGERY

## 2017-11-16 PROCEDURE — 99213 OFFICE O/P EST LOW 20 MIN: CPT | Mod: PBBFAC,PO | Performed by: SURGERY

## 2017-11-16 NOTE — PROGRESS NOTES
Pt returns for 6 week f/u.  Pt notes continued weight loss 5 lbs.  Still having some discomfort from the henria with straining.  Denies n/v    AFVSS  AAOx3  CTA B  Soft/nt/nd  Reducible L inguinal hernia      A/P: D/w pt.  I have recommended surgery at a time convenient to the pt.  He is a big maninder and prefers to defer surgery until next year at some point.  He will call to schedule

## 2018-01-29 ENCOUNTER — PATIENT OUTREACH (OUTPATIENT)
Dept: ADMINISTRATIVE | Facility: HOSPITAL | Age: 68
End: 2018-01-29

## 2018-01-29 NOTE — LETTER
February 6, 2018    Matthew Rice  [address]  [place] [address]             [facility]  [address]  [facility] [address]  Phone: [phone] Dear Mr. Rice:    We have tried to reach you by mychart unsuccessfully.    [facility] is committed to your overall health.  To help you get the most out of each of your visits, we will review your information to make sure you are up to date on all of your recommended tests and/or procedures.       Dr. Alvaro Bone has found that your chart shows you may be due for flu, pneumonia, and tetanus immunizations.     Medicare does not cover all immunizations to be given in the clinic.  Check your benefits to ensure that you do not need to receive your immunizations at the pharmacy.     If you have had any of the above done at another facility, please bring the records or information with you so that your record at [facility] will be complete.  If you would like to schedule any of these, please contact me.     If you are currently taking medication, please bring it with you to your appointment for review.     Also, if you have any type of Advanced Directives, please bring them with you to your office visit so we may scan them into your chart.     If you have any questions or concerns, please don't hesitate to call.    Thank you for letting us care for you,  Aida Wallace LPN Clinical Care Coordinator  [facility]  [phone]

## 2018-02-12 ENCOUNTER — LAB VISIT (OUTPATIENT)
Dept: LAB | Facility: HOSPITAL | Age: 68
End: 2018-02-12
Attending: FAMILY MEDICINE
Payer: MEDICARE

## 2018-02-12 ENCOUNTER — OFFICE VISIT (OUTPATIENT)
Dept: FAMILY MEDICINE | Facility: CLINIC | Age: 68
End: 2018-02-12
Payer: MEDICARE

## 2018-02-12 VITALS
WEIGHT: 283.5 LBS | DIASTOLIC BLOOD PRESSURE: 70 MMHG | SYSTOLIC BLOOD PRESSURE: 124 MMHG | BODY MASS INDEX: 36.38 KG/M2 | HEIGHT: 74 IN | HEART RATE: 65 BPM | TEMPERATURE: 99 F | OXYGEN SATURATION: 97 %

## 2018-02-12 DIAGNOSIS — E78.5 DYSLIPIDEMIA: ICD-10-CM

## 2018-02-12 DIAGNOSIS — K21.9 GASTROESOPHAGEAL REFLUX DISEASE WITHOUT ESOPHAGITIS: ICD-10-CM

## 2018-02-12 DIAGNOSIS — F41.9 ANXIETY: ICD-10-CM

## 2018-02-12 DIAGNOSIS — H10.13 ALLERGIC CONJUNCTIVITIS OF BOTH EYES: ICD-10-CM

## 2018-02-12 DIAGNOSIS — I10 ESSENTIAL HYPERTENSION: Primary | ICD-10-CM

## 2018-02-12 DIAGNOSIS — I10 ESSENTIAL HYPERTENSION: ICD-10-CM

## 2018-02-12 DIAGNOSIS — G47.30 SLEEP APNEA, UNSPECIFIED TYPE: ICD-10-CM

## 2018-02-12 LAB
ALBUMIN SERPL BCP-MCNC: 3.8 G/DL
ALP SERPL-CCNC: 76 U/L
ALT SERPL W/O P-5'-P-CCNC: 20 U/L
ANION GAP SERPL CALC-SCNC: 8 MMOL/L
AST SERPL-CCNC: 16 U/L
BILIRUB SERPL-MCNC: 0.7 MG/DL
BUN SERPL-MCNC: 17 MG/DL
CALCIUM SERPL-MCNC: 8.9 MG/DL
CHLORIDE SERPL-SCNC: 107 MMOL/L
CHOLEST SERPL-MCNC: 217 MG/DL
CHOLEST/HDLC SERPL: 5.3 {RATIO}
CO2 SERPL-SCNC: 24 MMOL/L
CREAT SERPL-MCNC: 0.8 MG/DL
EST. GFR  (AFRICAN AMERICAN): >60 ML/MIN/1.73 M^2
EST. GFR  (NON AFRICAN AMERICAN): >60 ML/MIN/1.73 M^2
GLUCOSE SERPL-MCNC: 83 MG/DL
HDLC SERPL-MCNC: 41 MG/DL
HDLC SERPL: 18.9 %
LDLC SERPL CALC-MCNC: 146.6 MG/DL
MAGNESIUM SERPL-MCNC: 2 MG/DL
NONHDLC SERPL-MCNC: 176 MG/DL
POTASSIUM SERPL-SCNC: 4.5 MMOL/L
PROT SERPL-MCNC: 7.2 G/DL
SODIUM SERPL-SCNC: 139 MMOL/L
TRIGL SERPL-MCNC: 147 MG/DL
TSH SERPL DL<=0.005 MIU/L-ACNC: 1.37 UIU/ML

## 2018-02-12 PROCEDURE — 36415 COLL VENOUS BLD VENIPUNCTURE: CPT | Mod: PN

## 2018-02-12 PROCEDURE — 80061 LIPID PANEL: CPT

## 2018-02-12 PROCEDURE — 80053 COMPREHEN METABOLIC PANEL: CPT

## 2018-02-12 PROCEDURE — 99214 OFFICE O/P EST MOD 30 MIN: CPT | Mod: S$PBB,,, | Performed by: FAMILY MEDICINE

## 2018-02-12 PROCEDURE — 99999 PR PBB SHADOW E&M-EST. PATIENT-LVL III: CPT | Mod: PBBFAC,,, | Performed by: FAMILY MEDICINE

## 2018-02-12 PROCEDURE — 83735 ASSAY OF MAGNESIUM: CPT

## 2018-02-12 PROCEDURE — 84443 ASSAY THYROID STIM HORMONE: CPT

## 2018-02-12 PROCEDURE — 99213 OFFICE O/P EST LOW 20 MIN: CPT | Mod: PBBFAC,PN | Performed by: FAMILY MEDICINE

## 2018-02-12 PROCEDURE — 1125F AMNT PAIN NOTED PAIN PRSNT: CPT | Mod: ,,, | Performed by: FAMILY MEDICINE

## 2018-02-12 PROCEDURE — 1159F MED LIST DOCD IN RCRD: CPT | Mod: ,,, | Performed by: FAMILY MEDICINE

## 2018-02-12 RX ORDER — OLOPATADINE HYDROCHLORIDE 1 MG/ML
1 SOLUTION/ DROPS OPHTHALMIC 2 TIMES DAILY
Qty: 5 ML | Refills: 5 | Status: SHIPPED | OUTPATIENT
Start: 2018-02-12 | End: 2019-02-12

## 2018-02-12 NOTE — PROGRESS NOTES
Subjective:       Patient ID: Matthew Rice is a 67 y.o. male.    Chief Complaint: No chief complaint on file.    Hypertension   This is a chronic problem. The current episode started 1 to 4 weeks ago. Progression since onset: improved. The problem is controlled. Associated symptoms include anxiety. Pertinent negatives include no chest pain, headaches, malaise/fatigue, orthopnea, palpitations, peripheral edema or shortness of breath. There are no associated agents to hypertension. Risk factors for coronary artery disease include dyslipidemia. Past treatments include angiotensin blockers. The current treatment provides moderate improvement. There are no compliance problems.  There is no history of angina, kidney disease or CAD/MI. There is no history of chronic renal disease.     Possible allergies to dog, eyes itch and watery, no runny nose    Muscle cramps, legs hands back  Tried mg, K, zinc, vinegar, no help  More salt helps  No statins or diuretics      Active Ambulatory Problems     Diagnosis Date Noted    Hypertension     Arthritis, shoulder region     Arthritis of knee     Sleep apnea     PTSD (post-traumatic stress disorder) 12/17/2013    Anxiety 09/16/2015    GERD (gastroesophageal reflux disease) 09/16/2015    Dyslipidemia 08/24/2016    Migraine without status migrainosus, not intractable 03/30/2017    Osteoarthritis of both knees 03/30/2017     Resolved Ambulatory Problems     Diagnosis Date Noted    No Resolved Ambulatory Problems     Past Medical History:   Diagnosis Date    Arthritis of knee     Arthritis, shoulder region     Calculus of kidney     Calculus of kidney     Complication of anesthesia     Depression     Essential hypertension, benign     Generalized osteoarthrosis, unspecified site     Hypertension     Idiopathic progressive polyneuropathy     Impotence of organic origin     Obesity, unspecified     Osteoarthrosis, unspecified whether generalized or localized,  unspecified site     Pneumonia     Posttraumatic stress disorder     Pure hypercholesterolemia     Seborrheic dermatitis, unspecified     Sleep apnea     Sprain of medial collateral ligament of knee     Unspecified conductive hearing loss      Current Outpatient Prescriptions on File Prior to Visit   Medication Sig Dispense Refill    buPROPion (WELLBUTRIN XL) 150 MG TB24 tablet Take 300 mg by mouth once daily.       cetirizine (ZYRTEC) 10 MG tablet Take 10 mg by mouth once daily.      diazePAM (VALIUM) 2 MG tablet Take 2 mg by mouth every 6 (six) hours as needed for Anxiety.      fish oil-omega-3 fatty acids 300-1,000 mg capsule Take 2 g by mouth once daily.      fluticasone (FLONASE) 50 mcg/actuation nasal spray 1 spray by Each Nare route once daily. 16 g 0    losartan (COZAAR) 25 MG tablet TAKE 1 TABLET DAILY 90 tablet 3    meloxicam (MOBIC) 7.5 MG tablet TAKE 1 TABLET DAILY 90 tablet 0    montelukast (SINGULAIR) 10 mg tablet Take 1 tablet (10 mg total) by mouth every evening. 90 tablet 1    omeprazole (PRILOSEC) 40 MG capsule TAKE 1 CAPSULE BEFORE BREAKFAST 90 capsule 3    sumatriptan (IMITREX) 50 MG tablet Take 50 mg by mouth every 2 (two) hours as needed for Migraine.      tramadol-acetaminophen 37.5-325 mg (ULTRACET) 37.5-325 mg Tab Take 1 tablet by mouth every 4 (four) hours as needed for Pain. 60 tablet 3    VIAGRA 100 mg tablet TAKE 1 TABLET DAILY AS NEEDED FOR ERECTILE DYSFUNCTION 15 tablet 1    vitamin D 1000 units Tab Take 1,000 Units by mouth 2 (two) times daily.       No current facility-administered medications on file prior to visit.        Review of Systems   Constitutional: Negative for malaise/fatigue.   Respiratory: Negative for shortness of breath.    Cardiovascular: Negative for chest pain, palpitations and orthopnea.   Neurological: Negative for headaches.       Objective:      Physical Exam   Constitutional: He is oriented to person, place, and time. He appears  well-developed and well-nourished. No distress.   HENT:   Head: Normocephalic and atraumatic.   Eyes: EOM and lids are normal. Pupils are equal, round, and reactive to light. Right eye exhibits no discharge and no exudate. Left eye exhibits no discharge and no exudate. Right conjunctiva is not injected. Right conjunctiva has no hemorrhage. Left conjunctiva is not injected. Left conjunctiva has no hemorrhage. No scleral icterus.   Neck: Normal range of motion. Neck supple.   Cardiovascular: Normal rate, regular rhythm, normal heart sounds and intact distal pulses.  Exam reveals no gallop and no friction rub.    No murmur heard.  Pulmonary/Chest: Effort normal and breath sounds normal. No respiratory distress. He has no wheezes.   Neurological: He is alert and oriented to person, place, and time.   Skin: Skin is warm and dry. He is not diaphoretic.   Psychiatric: He has a normal mood and affect. His behavior is normal.   Vitals reviewed.      Assessment:       1. Essential hypertension    2. Dyslipidemia    3. Anxiety    4. Sleep apnea, unspecified type    5. Gastroesophageal reflux disease without esophagitis    6. Allergic conjunctivitis of both eyes        Plan:       Matthew was seen today for follow-up.    Diagnoses and all orders for this visit:    Essential hypertension  -     Comprehensive metabolic panel; Future  -     TSH; Future  Stable    Dyslipidemia  -     Lipid panel; Future    Anxiety  Stable    Sleep apnea, unspecified type  Stable    Gastroesophageal reflux disease without esophagitis  -     Magnesium; Future    Allergic conjunctivitis of both eyes  -     olopatadine (PATANOL) 0.1 % ophthalmic solution; Place 1 drop into both eyes 2 (two) times daily.

## 2018-02-25 NOTE — PROGRESS NOTES
Patient, Matthew Rice (MRN #[id number]), presented with a recorded BMI of 36.4 kg/m^2 and a documented comorbidity(s):  - Hypertension  - Hyperlipidemia  to which the severe obesity is a contributing factor. This is consistent with the definition of severe obesity (BMI 35.0-35.9) with comorbidity (ICD-10 E66.01, Z68.35). The patient's severe obesity was monitored, evaluated, addressed and/or treated. This addendum to the medical record is made on 02/25/2018.

## 2018-05-17 ENCOUNTER — OFFICE VISIT (OUTPATIENT)
Dept: FAMILY MEDICINE | Facility: CLINIC | Age: 68
End: 2018-05-17
Payer: MEDICARE

## 2018-05-17 VITALS
BODY MASS INDEX: 35.79 KG/M2 | WEIGHT: 278.88 LBS | DIASTOLIC BLOOD PRESSURE: 70 MMHG | HEART RATE: 64 BPM | SYSTOLIC BLOOD PRESSURE: 124 MMHG | HEIGHT: 74 IN

## 2018-05-17 DIAGNOSIS — Z12.5 PROSTATE CANCER SCREENING: ICD-10-CM

## 2018-05-17 DIAGNOSIS — E78.5 DYSLIPIDEMIA: ICD-10-CM

## 2018-05-17 DIAGNOSIS — I10 ESSENTIAL HYPERTENSION: Primary | ICD-10-CM

## 2018-05-17 DIAGNOSIS — F41.9 ANXIETY: ICD-10-CM

## 2018-05-17 DIAGNOSIS — K63.5 POLYP OF COLON, UNSPECIFIED PART OF COLON, UNSPECIFIED TYPE: ICD-10-CM

## 2018-05-17 PROCEDURE — 99213 OFFICE O/P EST LOW 20 MIN: CPT | Mod: PBBFAC,PN | Performed by: FAMILY MEDICINE

## 2018-05-17 PROCEDURE — 99999 PR PBB SHADOW E&M-EST. PATIENT-LVL III: CPT | Mod: PBBFAC,,, | Performed by: FAMILY MEDICINE

## 2018-05-17 PROCEDURE — 99214 OFFICE O/P EST MOD 30 MIN: CPT | Mod: S$PBB,,, | Performed by: FAMILY MEDICINE

## 2018-05-17 RX ORDER — MELOXICAM 7.5 MG/1
7.5 TABLET ORAL DAILY
Qty: 90 TABLET | Refills: 1 | Status: SHIPPED | OUTPATIENT
Start: 2018-05-17 | End: 2018-09-20 | Stop reason: SDUPTHER

## 2018-05-17 NOTE — PROGRESS NOTES
Subjective:     THIS DOCUMENT WAS MADE IN PART WITH [employer] DICTATION SOFTWARE.  OCCASIONALLY THIS SOFTWARE WILL MISINTERPRET WORDS OR PHRASES.     Patient ID: Matthew Rice is a 68 y.o. male.    Chief Complaint: Follow-up (3-4 months---)    HPI   Matthew was seen today for follow-up.    Diagnoses and all orders for this visit:    Essential hypertension  This is a chronic problem, for many years, but it remained stable and under very good control.  Occasional exacerbations with stress but really has been excellent for awhile now    Dyslipidemia  Chronic condition with low HDL.  Currently on diet only.  Minimal exercise.  Mild worsening last time.  Due for repeat    Anxiety  Chronic and complex condition.  [facility]  No severe depression and no suicidal thoughts he assures me.  Polyp of colon, unspecified part of colon, unspecified type  Reviewed last colonoscopy in 2012, adenomatous polyp present.  He is due to repeated prefers to wait a few months  .  Chronic musculoskeletal pain. Remains on Mobic.  Refill requested.  As stated above blood pressure remains satisfactory and does not appear adversely affected by long-term NSAID usage.    Active Ambulatory Problems     Diagnosis Date Noted    Hypertension     Arthritis, shoulder region     Arthritis of knee     Sleep apnea     PTSD (post-traumatic stress disorder) 12/17/2013    Anxiety 09/16/2015    GERD (gastroesophageal reflux disease) 09/16/2015    Dyslipidemia 08/24/2016    Migraine without status migrainosus, not intractable 03/30/2017    Osteoarthritis of both knees 03/30/2017     Resolved Ambulatory Problems     Diagnosis Date Noted    No Resolved Ambulatory Problems     Past Medical History:   Diagnosis Date    Arthritis of knee     Arthritis, shoulder region     Calculus of kidney     Calculus of kidney     Complication of anesthesia     Depression     Essential hypertension, benign      Generalized osteoarthrosis, unspecified site     Hypertension     Idiopathic progressive polyneuropathy     Impotence of organic origin     Obesity, unspecified     Osteoarthrosis, unspecified whether generalized or localized, unspecified site     Pneumonia     Posttraumatic stress disorder     Pure hypercholesterolemia     Seborrheic dermatitis, unspecified     Sleep apnea     Sprain of medial collateral ligament of knee     Unspecified conductive hearing loss    ]        Review of Systems   Constitutional: Negative for fever.   HENT: Negative.    Eyes: Negative for visual disturbance.   Respiratory: Negative for shortness of breath.    Cardiovascular: Negative for chest pain.   Gastrointestinal: Negative for abdominal pain, constipation, diarrhea and nausea.        A known inguinal hernia, but no worsening or pain. He was advised must lose weight before proceeding with surgery unless the emergency were to develop   Musculoskeletal: Positive for arthralgias.   Psychiatric/Behavioral: Positive for dysphoric mood. Negative for sleep disturbance and suicidal ideas. The patient is nervous/anxious.        Objective:      Physical Exam   Constitutional: He is oriented to person, place, and time. He appears well-developed and well-nourished. No distress.   HENT:   Head: Normocephalic and atraumatic.   Eyes: EOM and lids are normal. Pupils are equal, round, and reactive to light. Right eye exhibits no exudate. Left eye exhibits no exudate. Right conjunctiva is not injected. Right conjunctiva has no hemorrhage. Left conjunctiva is not injected. Left conjunctiva has no hemorrhage. No scleral icterus.   Neck: Normal range of motion. Neck supple.   Cardiovascular: Normal rate, regular rhythm, normal heart sounds and intact distal pulses.  Exam reveals no gallop and no friction rub.    No murmur heard.  Pulmonary/Chest: Effort normal and breath sounds normal. No respiratory distress. He has no wheezes.    Neurological: He is alert and oriented to person, place, and time.   Skin: Skin is warm and dry. He is not diaphoretic.   Psychiatric: He has a normal mood and affect. His behavior is normal.   Vitals reviewed.      Assessment:       1. Essential hypertension    2. Dyslipidemia    3. Anxiety    4. Polyp of colon, unspecified part of colon, unspecified type        Plan:       Matthew was seen today for follow-up.    Diagnoses and all orders for this visit:    Essential hypertension  -     Comprehensive metabolic panel; Future    Dyslipidemia  -     Lipid panel; Future    Anxiety  Stable    Polyp of colon, unspecified part of colon, unspecified type  He requested to defer colonoscopy  For a few more months  Prostate cancer screening  -     PSA, Screening; Future    Other orders  -     meloxicam (MOBIC) 7.5 MG tablet; Take 1 tablet (7.5 mg total) by mouth once daily.     Continue current medications.  Repeat labs before follow-up in 3 months.  Work on diet and exercise.

## 2018-08-01 ENCOUNTER — PATIENT OUTREACH (OUTPATIENT)
Dept: ADMINISTRATIVE | Facility: HOSPITAL | Age: 68
End: 2018-08-01

## 2018-08-01 NOTE — LETTER
August 10, 2018    Matthew Rice  [address]  [place] [address]             [facility]  [address]  [facility] [address]  Phone: [phone] Dear Mr. Rice:    We have tried to reach you by mychart unsuccessfully.      [facility] is committed to your overall health.  To help you get the most out of each of your visits, we will review your information to make sure you are up to date on all of your recommended tests and or procedures.       Dr. Alvaro Bone MD has found that you may be due for:     Tetanus, pneumonia and flu vaccine   Colon cancer screening     If you have had any of the above done at another facility, please bring the records or information with you so that your record at [facility] will be complete and up to date.     If you have not had any of these tests or procedures done recently and would like to complete this testing before your appointment on 8/15/18 at 8:00 am with Alvaro Bone MD please call [phone] or send a message through your [facility] portal to your provider's office.     If you are currently taking medication, please bring it with you to your appointment for review.     Also, if you have any type of Advanced Directives, please bring them with you to your office visit so we may scan them into your chart.     Please feel free to call the number listed below if you have any questions.     Thanks,     Taryn Doyle LPN Clinical Care Coordinator   [facility]   [address] [facility] [address]   [place] [address]   [phone] (p)   [phone] (f)      Additional Information   If you have questions, you can email [email or web address] or call [phone]  to talk to our [facility] staff. Remember, [facility] is NOT to be used for urgent needs. For medical emergencies, dial 911.

## 2018-08-14 ENCOUNTER — LAB VISIT (OUTPATIENT)
Dept: LAB | Facility: HOSPITAL | Age: 68
End: 2018-08-14
Attending: FAMILY MEDICINE
Payer: MEDICARE

## 2018-08-14 DIAGNOSIS — I10 ESSENTIAL HYPERTENSION: ICD-10-CM

## 2018-08-14 DIAGNOSIS — Z12.5 PROSTATE CANCER SCREENING: ICD-10-CM

## 2018-08-14 DIAGNOSIS — E78.5 DYSLIPIDEMIA: ICD-10-CM

## 2018-08-14 LAB
ALBUMIN SERPL BCP-MCNC: 4.3 G/DL
ALP SERPL-CCNC: 62 U/L
ALT SERPL W/O P-5'-P-CCNC: 23 U/L
ANION GAP SERPL CALC-SCNC: 9 MMOL/L
AST SERPL-CCNC: 26 U/L
BILIRUB SERPL-MCNC: 1.3 MG/DL
BUN SERPL-MCNC: 16 MG/DL
CALCIUM SERPL-MCNC: 9.9 MG/DL
CHLORIDE SERPL-SCNC: 105 MMOL/L
CHOLEST SERPL-MCNC: 233 MG/DL
CHOLEST/HDLC SERPL: 5.8 {RATIO}
CO2 SERPL-SCNC: 27 MMOL/L
COMPLEXED PSA SERPL-MCNC: 1.6 NG/ML
CREAT SERPL-MCNC: 1 MG/DL
EST. GFR  (AFRICAN AMERICAN): >60 ML/MIN/1.73 M^2
EST. GFR  (NON AFRICAN AMERICAN): >60 ML/MIN/1.73 M^2
GLUCOSE SERPL-MCNC: 88 MG/DL
HDLC SERPL-MCNC: 40 MG/DL
HDLC SERPL: 17.2 %
LDLC SERPL CALC-MCNC: 157.2 MG/DL
NONHDLC SERPL-MCNC: 193 MG/DL
POTASSIUM SERPL-SCNC: 4.5 MMOL/L
PROT SERPL-MCNC: 7.3 G/DL
SODIUM SERPL-SCNC: 141 MMOL/L
TRIGL SERPL-MCNC: 179 MG/DL

## 2018-08-14 PROCEDURE — 80061 LIPID PANEL: CPT

## 2018-08-14 PROCEDURE — 80053 COMPREHEN METABOLIC PANEL: CPT

## 2018-08-14 PROCEDURE — 36415 COLL VENOUS BLD VENIPUNCTURE: CPT | Mod: PN

## 2018-08-14 PROCEDURE — 84153 ASSAY OF PSA TOTAL: CPT

## 2018-08-15 ENCOUNTER — OFFICE VISIT (OUTPATIENT)
Dept: FAMILY MEDICINE | Facility: CLINIC | Age: 68
End: 2018-08-15
Payer: MEDICARE

## 2018-08-15 VITALS
SYSTOLIC BLOOD PRESSURE: 138 MMHG | OXYGEN SATURATION: 95 % | HEIGHT: 74 IN | DIASTOLIC BLOOD PRESSURE: 84 MMHG | WEIGHT: 278 LBS | TEMPERATURE: 98 F | HEART RATE: 65 BPM | BODY MASS INDEX: 35.68 KG/M2

## 2018-08-15 DIAGNOSIS — J01.01 ACUTE RECURRENT MAXILLARY SINUSITIS: ICD-10-CM

## 2018-08-15 DIAGNOSIS — I10 ESSENTIAL HYPERTENSION: Primary | ICD-10-CM

## 2018-08-15 DIAGNOSIS — F41.9 ANXIETY: ICD-10-CM

## 2018-08-15 DIAGNOSIS — J30.9 ALLERGIC RHINITIS, UNSPECIFIED SEASONALITY, UNSPECIFIED TRIGGER: ICD-10-CM

## 2018-08-15 DIAGNOSIS — E78.5 DYSLIPIDEMIA: ICD-10-CM

## 2018-08-15 PROCEDURE — 99999 PR PBB SHADOW E&M-EST. PATIENT-LVL IV: CPT | Mod: PBBFAC,,, | Performed by: FAMILY MEDICINE

## 2018-08-15 PROCEDURE — 99214 OFFICE O/P EST MOD 30 MIN: CPT | Mod: S$PBB,,, | Performed by: FAMILY MEDICINE

## 2018-08-15 PROCEDURE — 99214 OFFICE O/P EST MOD 30 MIN: CPT | Mod: PBBFAC,PN | Performed by: FAMILY MEDICINE

## 2018-08-15 RX ORDER — FLUTICASONE PROPIONATE 50 MCG
1 SPRAY, SUSPENSION (ML) NASAL DAILY
Qty: 48 G | Refills: 1 | Status: SHIPPED | OUTPATIENT
Start: 2018-08-15

## 2018-08-15 RX ORDER — CETIRIZINE HYDROCHLORIDE 10 MG/1
10 TABLET ORAL DAILY
Qty: 90 TABLET | Refills: 1 | Status: SHIPPED | OUTPATIENT
Start: 2018-08-15 | End: 2019-08-15

## 2018-08-15 NOTE — PROGRESS NOTES
THIS DOCUMENT WAS MADE IN PART WITH VOICE RECOGNITION SOFTWARE.  OCCASIONALLY THIS SOFTWARE WILL MISINTERPRET WORDS OR PHRASES.      Matthew Krystal  1950    Subjective     Chief Complaint   Patient presents with    Follow-up       HPI    Essential hypertension  Has been stable, similar readings at home, mildly above ideal    Dyslipidemia  Worsening, has been reducing sugars, but states has not increased meats or fatty foods    Anxiety sprays  Stable    Allergic rhinitis.  Recent worsening of symptoms.  runny nose, itchy watery eyes postnasal drainage.  No fever.  No headaches.  Remains on Singulair only.  No antihistamines or nasal      Active Ambulatory Problems     Diagnosis Date Noted    Hypertension     Arthritis, shoulder region     Arthritis of knee     Sleep apnea     PTSD (post-traumatic stress disorder) 12/17/2013    Anxiety 09/16/2015    GERD (gastroesophageal reflux disease) 09/16/2015    Dyslipidemia 08/24/2016    Migraine without status migrainosus, not intractable 03/30/2017    Osteoarthritis of both knees 03/30/2017     Resolved Ambulatory Problems     Diagnosis Date Noted    No Resolved Ambulatory Problems     Past Medical History:   Diagnosis Date    Arthritis of knee     Arthritis, shoulder region     Calculus of kidney     Calculus of kidney     Complication of anesthesia     Depression     Essential hypertension, benign     Generalized osteoarthrosis, unspecified site     Hypertension     Idiopathic progressive polyneuropathy     Impotence of organic origin     Obesity, unspecified     Osteoarthrosis, unspecified whether generalized or localized, unspecified site     Pneumonia     Posttraumatic stress disorder     Pure hypercholesterolemia     Seborrheic dermatitis, unspecified     Sleep apnea     Sprain of medial collateral ligament of knee     Unspecified conductive hearing loss          Review of Systems   HENT: Positive for congestion, postnasal drip and  "sneezing.    Eyes: Positive for itching. Negative for visual disturbance.   Respiratory: Negative for shortness of breath and wheezing.    Cardiovascular: Negative for chest pain and leg swelling.   Gastrointestinal: Negative.    Genitourinary: Negative.    Musculoskeletal: Positive for arthralgias.   Psychiatric/Behavioral: Negative for dysphoric mood. The patient is nervous/anxious.        Objective     Physical Exam   Constitutional: He is oriented to person, place, and time. He appears well-developed and well-nourished. No distress.   HENT:   Head: Normocephalic and atraumatic.   Eyes: No scleral icterus.   Neck: Normal range of motion. Neck supple.   Cardiovascular: Normal rate, regular rhythm and normal heart sounds. Exam reveals no gallop.   No murmur heard.  Pulmonary/Chest: Effort normal and breath sounds normal. No respiratory distress.   Neurological: He is alert and oriented to person, place, and time. He has normal reflexes.   Skin: Skin is warm and dry. He is not diaphoretic.   Psychiatric: He has a normal mood and affect. His behavior is normal.   Vitals reviewed.    Vitals:    08/15/18 0810   BP: 138/84   BP Location: Left arm   Patient Position: Sitting   BP Method: Medium (Manual)   Pulse: 65   Temp: 98.4 °F (36.9 °C)   TempSrc: Oral   SpO2: 95%   Weight: 126.1 kg (278 lb)   Height: 6' 2" (1.88 m)       MOST RECENT LABS IN OUR ELECTRONIC MEDICAL RECORD:     Results for orders placed or performed in visit on 08/14/18   Comprehensive metabolic panel   Result Value Ref Range    Sodium 141 136 - 145 mmol/L    Potassium 4.5 3.5 - 5.1 mmol/L    Chloride 105 95 - 110 mmol/L    CO2 27 23 - 29 mmol/L    Glucose 88 70 - 110 mg/dL    BUN, Bld 16 8 - 23 mg/dL    Creatinine 1.0 0.5 - 1.4 mg/dL    Calcium 9.9 8.7 - 10.5 mg/dL    Total Protein 7.3 6.0 - 8.4 g/dL    Albumin 4.3 3.5 - 5.2 g/dL    Total Bilirubin 1.3 (H) 0.1 - 1.0 mg/dL    Alkaline Phosphatase 62 55 - 135 U/L    AST 26 10 - 40 U/L    ALT 23 10 - 44 " U/L    Anion Gap 9 8 - 16 mmol/L    eGFR if African American >60.0 >60 mL/min/1.73 m^2    eGFR if non African American >60.0 >60 mL/min/1.73 m^2   Lipid panel   Result Value Ref Range    Cholesterol 233 (H) 120 - 199 mg/dL    Triglycerides 179 (H) 30 - 150 mg/dL    HDL 40 40 - 75 mg/dL    LDL Cholesterol 157.2 63.0 - 159.0 mg/dL    HDL/Chol Ratio 17.2 (L) 20.0 - 50.0 %    Total Cholesterol/HDL Ratio 5.8 (H) 2.0 - 5.0    Non-HDL Cholesterol 193 mg/dL   PSA, Screening   Result Value Ref Range    PSA, SCREEN 1.6 0.00 - 4.00 ng/mL         Matthew was seen today for follow-up.    Diagnoses and all orders for this visit:    Essential hypertension  Reasonably stable.  Continue current medication.  Work on weight loss  .  Dyslipidemia  -     Lipid panel; Future  Worsening.  I did recommend medication but he declined and would like to continue to work on this.  Advise him that it would require significant changes because it has been this way for some time but he still request more time.  Patient given information on a low fat diet and advised    Anxiety    stable    Allergic rhinitis, unspecified seasonality, unspecified trigger  -     fluticasone (FLONASE) 50 mcg/actuation nasal spray; 1 spray (50 mcg total) by Each Nare route once daily.  -     cetirizine (ZYRTEC) 10 MG tablet; Take 1 tablet (10 mg total) by mouth once daily.  Continue Singulair, add Zyrtec and Flonase at least temporarily.  Once symptoms under control can back down to 1 or 2 meds for maintenance depending on response

## 2018-09-20 DIAGNOSIS — M17.0 OSTEOARTHRITIS OF BOTH KNEES, UNSPECIFIED OSTEOARTHRITIS TYPE: ICD-10-CM

## 2018-09-20 NOTE — TELEPHONE ENCOUNTER
LM or pt to return our call. Pt is calling because he states he is in the middle of moving and lost all of his medications.  NEED MORE INFO.

## 2018-09-20 NOTE — TELEPHONE ENCOUNTER
----- Message from Caroline Alberto sent at 9/20/2018  3:34 PM CDT -----  Contact: self  Type:  Patient Returning Call    Who Called: patient   Who Left Message for Patient:  Susana   Does the patient know what this is regarding?:   Best Call Back Number: [phone]    Additional Information:  Call place to POD

## 2018-09-20 NOTE — TELEPHONE ENCOUNTER
Let me know exactly which meds he needs ([facility], so don't know if he needs these)    And which pharmacy, I am assuming a local pharmacy

## 2018-09-20 NOTE — TELEPHONE ENCOUNTER
----- Message from Zayra Nelson sent at 9/20/2018  9:34 AM CDT -----  Type: Needs Medical Advice    Who Called:  Patient  Symptoms (please be specific):  Ebony  How long has patient had these symptoms:  Ebony  Pharmacy name and phone #:  [facility]  Best Call Back Number:[phone]    Additional Information: Patient stating he middle of moving, unpacking boxes and lost all of his medicatins

## 2018-09-20 NOTE — TELEPHONE ENCOUNTER
Patient advised he moved and lost his medications when he was unpacking he could not find the bag he put the medication in. I advised the patient to keep looking because his insurance may not cover the medication if it's not time to be refilled. I also advised the patient that I was not sure if his narcotics would be refilled because he could not find the medication. I did not pend the medication wanted to know if there was anything you could recommend.

## 2018-09-21 RX ORDER — MONTELUKAST SODIUM 10 MG/1
10 TABLET ORAL NIGHTLY
Qty: 90 TABLET | Refills: 1 | Status: SHIPPED | OUTPATIENT
Start: 2018-09-21

## 2018-09-21 RX ORDER — OMEPRAZOLE 40 MG/1
40 CAPSULE, DELAYED RELEASE ORAL
Qty: 90 CAPSULE | Refills: 1 | Status: SHIPPED | OUTPATIENT
Start: 2018-09-21

## 2018-09-21 RX ORDER — TRAMADOL HYDROCHLORIDE AND ACETAMINOPHEN 37.5; 325 MG/1; MG/1
1 TABLET, FILM COATED ORAL EVERY 4 HOURS PRN
Qty: 60 TABLET | Refills: 1 | Status: SHIPPED | OUTPATIENT
Start: 2018-09-21 | End: 2021-11-30

## 2018-09-21 RX ORDER — MELOXICAM 7.5 MG/1
7.5 TABLET ORAL DAILY
Qty: 90 TABLET | Refills: 1 | Status: SHIPPED | OUTPATIENT
Start: 2018-09-21

## 2018-09-21 RX ORDER — LOSARTAN POTASSIUM 25 MG/1
25 TABLET ORAL DAILY
Qty: 90 TABLET | Refills: 1 | Status: SHIPPED | OUTPATIENT
Start: 2018-09-21 | End: 2021-11-30

## 2018-09-21 NOTE — TELEPHONE ENCOUNTER
Patient states that even though he is out of his medication they still need to go to the mail pharmacy and tramadol locally. Advised that we can not verify that his insurance will cover early refills on medications. Verbalized understanding.

## 2018-09-21 NOTE — TELEPHONE ENCOUNTER
----- Message from Bernice Holden sent at 9/21/2018  7:29 AM CDT -----  Type:  Patient Returning Call    Who Called:  Patient   Who Left Message for Patient:  Marieale Baconl  Does the patient know what this is regarding?:  ?  Best Call Back Number:  [phone]  Additional Information:

## 2018-11-05 ENCOUNTER — TELEPHONE (OUTPATIENT)
Dept: FAMILY MEDICINE | Facility: CLINIC | Age: 68
End: 2018-11-05

## 2018-11-05 ENCOUNTER — OFFICE VISIT (OUTPATIENT)
Dept: PRIMARY CARE CLINIC | Facility: CLINIC | Age: 68
End: 2018-11-05
Payer: MEDICARE

## 2018-11-05 VITALS
SYSTOLIC BLOOD PRESSURE: 122 MMHG | BODY MASS INDEX: 35.36 KG/M2 | HEIGHT: 74 IN | WEIGHT: 275.56 LBS | DIASTOLIC BLOOD PRESSURE: 78 MMHG | HEART RATE: 84 BPM

## 2018-11-05 DIAGNOSIS — L23.7 POISON IVY DERMATITIS: Primary | ICD-10-CM

## 2018-11-05 PROCEDURE — 99214 OFFICE O/P EST MOD 30 MIN: CPT | Mod: PBBFAC,PO | Performed by: NURSE PRACTITIONER

## 2018-11-05 PROCEDURE — 99999 PR PBB SHADOW E&M-EST. PATIENT-LVL IV: CPT | Mod: PBBFAC,,, | Performed by: NURSE PRACTITIONER

## 2018-11-05 PROCEDURE — 99214 OFFICE O/P EST MOD 30 MIN: CPT | Mod: S$PBB,,, | Performed by: NURSE PRACTITIONER

## 2018-11-05 RX ORDER — PREDNISONE 20 MG/1
TABLET ORAL
Qty: 13 TABLET | Refills: 0 | Status: SHIPPED | OUTPATIENT
Start: 2018-11-05 | End: 2021-11-30

## 2018-11-05 NOTE — TELEPHONE ENCOUNTER
----- Message from Ramirez Rose sent at 11/5/2018  2:28 PM CST -----  Contact: pt  Type:  Same Day Appointment Request    Caller is requesting a same day appointment.  Caller declined first available appointment listed below.      Name of Caller:  pt  When is the first available appointment?  Tomorrow morning   Symptoms:  poison ivy  Best Call Back Number:     Additional Information:

## 2018-11-05 NOTE — TELEPHONE ENCOUNTER
Patient requesting to be seen today. States that he has poison fernie. Scheduled to see Amadeo today.

## 2018-11-05 NOTE — PATIENT INSTRUCTIONS
Avoiding Poison Ivy, Poison Oak, and Poison Sumac  Poison oak, poison ivy, and poison sumac are plants that can cause skin rashes. The problem is a sap oil called urushiol that is contained in these plants. If you're allergic to urushiol, touching one of these plants may cause your skin to react. Within hours or days, you may have a red, swollen, itchy rash. You can't stop the rash. But you can soothe the itching.        Recognizing these plants  You can help prevent a poison oak, poison ivy, or poison sumac rash. Know what these plants look like. And then avoid them. They grow in the form of bouchra, small plants, and large bushes. In most cases, poison oak and poison ivy have 3 leaves per stem. Poison sumac has from 7 leaves to 13 leaves per stem. Watch out for these plants when you go to any outdoor area, from a friend's overgrown back yard to [facility]. Urushiol is present in these plants all year round, even when the leaves are gone. So always be on the lookout.  What causes a reaction?  Poison oak, poison ivy, and poison sumac thrive mainly in unmaintained outdoor areas. If you touch these plants, you may get a rash. You may also react if you touch something that came in contact with urushiol. This could be a dog or cat, clothing, or equipment. But the rash caused by these plants is not contagious.  Steps to prevention  When heading outdoors, take these preventive steps:  · Avoid touching any of these plants.  · Wear long pants and a long-sleeved shirt.  · If you're going to a heavily wooded or brushy area, also put on gloves, a hat, and boots.  · If you are very sensitive, apply bentoquatam 5% topical cream to all exposed areas of your skin. This creates a layer of protection between your skin and any sap oil you may touch.  · If you come in contact with these plants or the oil, wash with soap and water as soon as possible.  · Wash clothing and animals that come in contact with these plants as well.  Urushiol may stay on them and cause a rash when you touch them in the future.  Date Last Reviewed: 3/1/2017  © 0383-9460 [employer]. [address] [place] [address], [place] [address]. All rights reserved. This information is not intended as a substitute for professional medical care. Always follow your healthcare professional's instructions.    Take the steroid tablets as directed.  Notify the surgeon of the treatment. (If necessary--skip the last 2 days).  Continue topical treatment.  Benadryl 25-50 mg every 6 hrs (but it will cause drowsiness).    If you have any questions, please call.  You can reach us at [phone] Monday through Thursday (except holidays) 10 a.m. to 6 p.m. or call Dr. Bone's office.    Thank you for using the [facility]!    GABRIELLA Huynh, CNP, FNP-BC [facility]    To rate your experience with DAVID Huynh, click on the link below:        [email or web address]

## 2018-11-05 NOTE — PROGRESS NOTES
"Matthew Rice is a 68 y.o. male patient of Alvaro Bone MD who presents to the clinic today for   Chief Complaint   Patient presents with    Poison Ivy     every where   .    HPI    Pt, who is known to me, reports a new problem to me:  Poison ivy exposure to others who had been clearing up brush.  He picked up a few branches but had on gloves.  He's been using Kailyn Rest .    These symptoms began 2 days ago and status is worse.     Pt denies the following symptoms:  Fever, feeling ill    Aggravating factors include clothing touching the rash .    Relieving factors include nothing .    OTC Medications tried are Ivy Rest.    Prescription medications taken for symptoms are none.    Pertinent medical history:  H/o poison ivy reaction, last 4 yrs ago    ROS    Constitutional:  No fever.    Skin:  See chief complaint/HPI.    HEENT:  No complaints.    Heart/Lungs:  No complaints    GI/:  No sxs.    MS:  No new problems in bones, joints or muscles.      PAST MEDICAL HISTORY:  Past Medical History:   Diagnosis Date    Arthritis of knee     Arthritis, shoulder region     Calculus of kidney     Calculus of kidney     Complication of anesthesia     pt states he has a "hard time waking up"    Depression     Essential hypertension, benign     Generalized osteoarthrosis, unspecified site     Hypertension     Idiopathic progressive polyneuropathy     Impotence of organic origin     Obesity, unspecified     Osteoarthrosis, unspecified whether generalized or localized, unspecified site     Pneumonia     1-4-13    Posttraumatic stress disorder     Pure hypercholesterolemia     Seborrheic dermatitis, unspecified     Sleep apnea     Sprain of medial collateral ligament of knee     Unspecified conductive hearing loss        PAST SURGICAL HISTORY:  Past Surgical History:   Procedure Laterality Date    ABDOMINAL HERNIA REPAIR      COLONOSCOPY N/A 2/11/2013    Performed by [facility] Jr[facility] at [facility] " ENDO    EGD (ESOPHAGOGASTRODUODENOSCOPY) N/A 2/11/2013    [facility]    thumb cyst excision         SOCIAL HISTORY:  Social History     Socioeconomic History    Marital status: Single     Spouse name: Not on file    Number of children: Not on file    Years of education: Not on file    Highest education level: Not on file   Social Needs    Financial resource strain: Not on file    Food insecurity - worry: Not on file    Food insecurity - inability: Not on file    Transportation needs - medical: Not on file    Transportation needs - non-medical: Not on file   Occupational History    Not on file   Tobacco Use    Smoking status: Never Smoker    Smokeless tobacco: Never Used   Substance and Sexual Activity    Alcohol use: Yes     Comment: 4-5 beers 2-3 times per week    Drug use: Not on file    Sexual activity: Not on file   Other Topics Concern    Not on file   Social History Narrative    Not on file       FAMILY HISTORY:  Family History   Problem Relation Age of Onset    Anesthesia problems Neg Hx     Clotting disorder Neg Hx        ALLERGIES AND MEDICATIONS: updated and reviewed.  Review of patient's allergies indicates:  No Known Allergies  Current Outpatient Medications   Medication Sig Dispense Refill    buPROPion (WELLBUTRIN XL) 150 MG TB24 tablet Take 300 mg by mouth once daily.       cetirizine (ZYRTEC) 10 MG tablet Take 1 tablet (10 mg total) by mouth once daily. 90 tablet 1    diazePAM (VALIUM) 2 MG tablet Take 2 mg by mouth every 6 (six) hours as needed for Anxiety.      fish oil-omega-3 fatty acids 300-1,000 mg capsule Take 2 g by mouth once daily.      fluticasone (FLONASE) 50 mcg/actuation nasal spray 1 spray (50 mcg total) by Each Nare route once daily. 48 g 1    losartan (COZAAR) 25 MG tablet Take 1 tablet (25 mg total) by mouth once daily. 90 tablet 1    meloxicam (MOBIC) 7.5 MG tablet Take 1 tablet (7.5 mg total) by mouth once daily.  90 tablet 1    montelukast (SINGULAIR) 10 mg tablet Take 1 tablet (10 mg total) by mouth every evening. 90 tablet 1    olopatadine (PATANOL) 0.1 % ophthalmic solution Place 1 drop into both eyes 2 (two) times daily. 5 mL 5    omeprazole (PRILOSEC) 40 MG capsule Take 1 capsule (40 mg total) by mouth before breakfast. 90 capsule 1    sumatriptan (IMITREX) 50 MG tablet Take 50 mg by mouth every 2 (two) hours as needed for Migraine.      tramadol-acetaminophen 37.5-325 mg (ULTRACET) 37.5-325 mg Tab Take 1 tablet by mouth every 4 (four) hours as needed for Pain. 60 tablet 1    vitamin D 1000 units Tab Take 1,000 Units by mouth 2 (two) times daily.      VIAGRA 100 mg tablet TAKE 1 TABLET DAILY AS NEEDED FOR ERECTILE DYSFUNCTION 15 tablet 1     No current facility-administered medications for this visit.          PHYSICAL EXAM    Alert, coop 68 y.o. male patient in no acute distress, is not ill-appearing.    Vitals:    11/05/18 1625   BP: 122/78   Pulse: 84     VS reviewed.  VS stable.  CC, nursing note, medications & PMH all reviewed today.    Head:  Normocephalic, atraumatic.    EENT:  Ext nose/ears normal.               Eyes with normal lids, not injected.     Resp:  Respirations even, unlabored    Heart:  Regular rate.  CV:  Cap RF brisk    MS:  Ambulates normally.     NEURO:  Alert and oriented x 4.  Responds appropriately during interaction.               Skin:  Warm, dry, color good.            A/an vesicular rash on an erythematous base is noted on:    both forearms (dense areas of vesicles), face (around left corner of the mouth), bilat shoulders (small erythematous lesions) upper back (small groups of erythematous papules), left lower abd (larger area) and right ankle (linear distribution of erythematous papules)           No pustules noted.    Psych:  Responds appropriately throughout the visit.               Relaxed.  Well-groomed    Poison ivy dermatitis  -     predniSONE (DELTASONE) 20 MG tablet; 3  tabs daily for 2 days, 2 tabs daily for 2 days, 1 tab daily for 2 days, then 1/2 tab daily for 2 days.  Dispense: 13 tablet; Refill: 0      Pt today presents with vesicular pruritic rash on multiple skin areas after being outside when land was being cleared.  States he only picked up a couple of branches and had gloves on.  See above for the rash distributions.  Lungs are CTA.    This is a new problem to me.  No work up is planned.        Pt advised to perform comfort measures recommended on patient instruction sheet .    If not better in 2 days, the patient is advised to call us.  If worse or concerns, the patient is advised to call us.  Explained exam findings, diagnosis and treatment plan to patient.  Questions answered and patient states understanding.

## 2018-11-05 NOTE — Clinical Note
Alvaro Bone MD,  I saw your patient today in the [facility].  If you have any questions, please do not hesitate to contact me.  Thank you!  DAVID Huynh

## 2018-11-09 ENCOUNTER — LAB VISIT (OUTPATIENT)
Dept: LAB | Facility: HOSPITAL | Age: 68
End: 2018-11-09
Attending: FAMILY MEDICINE
Payer: MEDICARE

## 2018-11-09 DIAGNOSIS — E78.5 DYSLIPIDEMIA: ICD-10-CM

## 2018-11-09 LAB
CHOLEST SERPL-MCNC: 217 MG/DL
CHOLEST/HDLC SERPL: 4.7 {RATIO}
HDLC SERPL-MCNC: 46 MG/DL
HDLC SERPL: 21.2 %
LDLC SERPL CALC-MCNC: 142.6 MG/DL
NONHDLC SERPL-MCNC: 171 MG/DL
TRIGL SERPL-MCNC: 142 MG/DL

## 2018-11-09 PROCEDURE — 36415 COLL VENOUS BLD VENIPUNCTURE: CPT | Mod: PN

## 2018-11-09 PROCEDURE — 80061 LIPID PANEL: CPT

## 2020-05-06 ENCOUNTER — PATIENT MESSAGE (OUTPATIENT)
Dept: ADMINISTRATIVE | Facility: HOSPITAL | Age: 70
End: 2020-05-06

## 2020-09-17 ENCOUNTER — TELEPHONE (OUTPATIENT)
Dept: FAMILY MEDICINE | Facility: CLINIC | Age: 70
End: 2020-09-17

## 2020-09-17 NOTE — TELEPHONE ENCOUNTER
Mr. Saleh returned my call and declined [facility] appt. [facility].

## 2020-09-29 ENCOUNTER — PATIENT MESSAGE (OUTPATIENT)
Dept: OTHER | Facility: OTHER | Age: 70
End: 2020-09-29

## 2020-10-05 ENCOUNTER — PATIENT MESSAGE (OUTPATIENT)
Dept: ADMINISTRATIVE | Facility: HOSPITAL | Age: 70
End: 2020-10-05

## 2020-12-11 ENCOUNTER — PATIENT MESSAGE (OUTPATIENT)
Dept: OTHER | Facility: OTHER | Age: 70
End: 2020-12-11

## 2021-01-04 ENCOUNTER — PATIENT MESSAGE (OUTPATIENT)
Dept: ADMINISTRATIVE | Facility: HOSPITAL | Age: 71
End: 2021-01-04

## 2021-04-06 ENCOUNTER — PATIENT MESSAGE (OUTPATIENT)
Dept: ADMINISTRATIVE | Facility: HOSPITAL | Age: 71
End: 2021-04-06

## 2021-04-29 ENCOUNTER — PATIENT MESSAGE (OUTPATIENT)
Dept: RESEARCH | Facility: HOSPITAL | Age: 71
End: 2021-04-29

## 2021-07-07 ENCOUNTER — PATIENT MESSAGE (OUTPATIENT)
Dept: ADMINISTRATIVE | Facility: HOSPITAL | Age: 71
End: 2021-07-07

## 2021-12-06 PROBLEM — K40.90 NON-RECURRENT UNILATERAL INGUINAL HERNIA WITHOUT OBSTRUCTION OR GANGRENE: Status: ACTIVE | Noted: 2021-12-06

## 2024-05-15 NOTE — PROGRESS NOTES
"Matthew Rice is 67 years old, had an injection 10 months ago, responded well,   requesting an injection today.  Pain is 5/10 on the pain scale, aching pain.    Exam shows tenderness at the medial joint line of the right knee.  No signs of   infection.    X-rays show joint space narrowing.    ASSESSMENT:  Right knee arthrosis.    PLAN:  Kenalog injection to the right knee.  Continue with strengthening and   weight loss.  Followup as needed.      PBB/HN  dd: 11/10/2017 08:59:59 (CST)  td: 11/10/2017 20:47:05 (CST)  Doc ID   #[id number]  Job ID #[id number]    CC:     Further History  Aching pain  Worse with activity  Relieved with rest  No other associated symptoms  No other radiation    Further Exam  Alert and oriented  Pleasant  Contralateral limb has appropriate range of motion for age and condition  Contralateral limb has appropriate strength for age and condition  Contralateral limb has appropriate stability  for age and condition  No adenopathy  Pulses are appropriate for current condition  Skin is intact        Chief Complaint    Chief Complaint   Patient presents with    Right Knee - Pain       HPI  Matthew Rice is a 67 y.o.  male who presents with       Past Medical History  Past Medical History:   Diagnosis Date    Arthritis of knee     Arthritis, shoulder region     Calculus of kidney     Calculus of kidney     Complication of anesthesia     pt states he has a "hard time waking up"    Depression     Essential hypertension, benign     Generalized osteoarthrosis, unspecified site     Hypertension     Idiopathic progressive polyneuropathy     Impotence of organic origin     Obesity, unspecified     Osteoarthrosis, unspecified whether generalized or localized, unspecified site     Pneumonia     1-4-13    Posttraumatic stress disorder     Pure hypercholesterolemia     Seborrheic dermatitis, unspecified     Sleep apnea     Sprain of medial collateral ligament of knee     Unspecified conductive " hearing loss        Past Surgical History  Past Surgical History:   Procedure Laterality Date    ABDOMINAL HERNIA REPAIR      thumb cyst excision         Medications  Current Outpatient Prescriptions   Medication Sig    acepromazine maleate, bulk, 100 % Powd by Misc.(Non-Drug; Combo Route) route.      buPROPion (WELLBUTRIN XL) 150 MG TB24 tablet Take 300 mg by mouth once daily.     cetirizine (ZYRTEC) 10 MG tablet Take 10 mg by mouth once daily.    diazePAM (VALIUM) 2 MG tablet Take 2 mg by mouth every 6 (six) hours as needed for Anxiety.    fish oil-omega-3 fatty acids 300-1,000 mg capsule Take 2 g by mouth once daily.    fluticasone (FLONASE) 50 mcg/actuation nasal spray 1 spray by Each Nare route once daily.    losartan (COZAAR) 25 MG tablet TAKE 1 TABLET DAILY    meloxicam (MOBIC) 7.5 MG tablet TAKE 1 TABLET DAILY    montelukast (SINGULAIR) 10 mg tablet Take 1 tablet (10 mg total) by mouth every evening.    omeprazole (PRILOSEC) 40 MG capsule TAKE 1 CAPSULE BEFORE BREAKFAST    sumatriptan (IMITREX) 50 MG tablet Take 50 mg by mouth every 2 (two) hours as needed for Migraine.    tramadol-acetaminophen 37.5-325 mg (ULTRACET) 37.5-325 mg Tab Take 1 tablet by mouth every 4 (four) hours as needed for Pain.    VIAGRA 100 mg tablet TAKE 1 TABLET DAILY AS NEEDED FOR ERECTILE DYSFUNCTION    vitamin D 1000 units Tab Take 1,000 Units by mouth 2 (two) times daily.     No current facility-administered medications for this visit.        Allergies  Review of patient's allergies indicates:  No Known Allergies    Family History  Family History   Problem Relation Age of Onset    Anesthesia problems Neg Hx     Clotting disorder Neg Hx        Social History  Social History     Social History    Marital status: Single     Spouse name: N/A    Number of children: N/A    Years of education: N/A     Occupational History    Not on file.     Social History Main Topics    Smoking status: Never Smoker    Smokeless  tobacco: Never Used    Alcohol use Yes      Comment: 4-5 beers 2-3 times per week    Drug use: Unknown    Sexual activity: Not on file     Other Topics Concern    Not on file     Social History Narrative    No narrative on file               Review of Systems     Constitutional: Negative    HENT: Negative  Eyes: Negative  Respiratory: Negative  Cardiovascular: Negative  Musculoskeletal: HPI  Skin: Negative  Neurological: Negative  Hematological: Negative  Endocrine: Negative                 Physical Exam    There were no vitals filed for this visit.  Body mass index is 34.79 kg/m².  Physical Examination:     General appearance -  well appearing, and in no distress  Mental status - awake  Neck - supple  Chest -  symmetric air entry  Heart - normal rate   Abdomen - soft      Assessment     1. Primary osteoarthritis of right knee    2. Right knee pain, unspecified chronicity          Plan   15-May-2024 10:41